# Patient Record
Sex: FEMALE | Race: WHITE | NOT HISPANIC OR LATINO | Employment: OTHER | URBAN - METROPOLITAN AREA
[De-identification: names, ages, dates, MRNs, and addresses within clinical notes are randomized per-mention and may not be internally consistent; named-entity substitution may affect disease eponyms.]

---

## 2024-06-25 ENCOUNTER — OFFICE VISIT (OUTPATIENT)
Dept: FAMILY MEDICINE CLINIC | Facility: CLINIC | Age: 89
End: 2024-06-25
Payer: COMMERCIAL

## 2024-06-25 ENCOUNTER — PATIENT ROUNDING (BHMG ONLY) (OUTPATIENT)
Dept: FAMILY MEDICINE CLINIC | Facility: CLINIC | Age: 89
End: 2024-06-25
Payer: COMMERCIAL

## 2024-06-25 VITALS
HEIGHT: 63 IN | TEMPERATURE: 97.8 F | DIASTOLIC BLOOD PRESSURE: 58 MMHG | OXYGEN SATURATION: 99 % | BODY MASS INDEX: 23 KG/M2 | HEART RATE: 62 BPM | SYSTOLIC BLOOD PRESSURE: 99 MMHG | WEIGHT: 129.8 LBS

## 2024-06-25 DIAGNOSIS — Z76.89 ENCOUNTER TO ESTABLISH CARE: Primary | ICD-10-CM

## 2024-06-25 PROBLEM — E78.2 MIXED HYPERLIPIDEMIA: Status: ACTIVE | Noted: 2024-06-25

## 2024-06-25 RX ORDER — IRBESARTAN 300 MG/1
300 TABLET ORAL DAILY
COMMUNITY

## 2024-06-25 RX ORDER — DONEPEZIL HYDROCHLORIDE 5 MG/1
TABLET, FILM COATED ORAL
COMMUNITY

## 2024-06-25 RX ORDER — PANTOPRAZOLE SODIUM 40 MG/1
TABLET, DELAYED RELEASE ORAL
COMMUNITY
Start: 2024-02-14

## 2024-06-25 RX ORDER — LANOLIN ALCOHOL/MO/W.PET/CERES
1000 CREAM (GRAM) TOPICAL DAILY
COMMUNITY

## 2024-06-25 RX ORDER — DILTIAZEM HYDROCHLORIDE 300 MG/1
300 CAPSULE, COATED, EXTENDED RELEASE ORAL DAILY
COMMUNITY
Start: 2024-01-17

## 2024-06-25 RX ORDER — FAMOTIDINE 20 MG/1
20 TABLET, FILM COATED ORAL
COMMUNITY

## 2024-06-25 RX ORDER — PRAVASTATIN SODIUM 20 MG
20 TABLET ORAL DAILY
COMMUNITY

## 2024-06-25 RX ORDER — SPIRONOLACTONE 25 MG/1
0.5 TABLET ORAL DAILY
COMMUNITY
Start: 2024-06-10

## 2024-06-25 NOTE — PROGRESS NOTES
"Chief Complaint  Establish Care    Subjective        Cat Dickinson presents to [facility] PRIMARY CARE  History of Present Illness    History of Present Illness  Cat is a new patient presenting with her , Mitch, to establish care.  Previous provider was Francesco Kilgore MD.  She has HTN, mixed hyperlipidemia, GERD, mild cognitive impairment with memory loss, nocturnal leg cramps, CKD3b, osteoporosis (prolia Q6mo), BPPV/vertigo, history of kidney stones.  Also follows with Bart Jones MD, neurology. Maribell Duarte MD, urology, Fady Hernandez MD, orthopedics, Licha Willis MD, endocrinology, Gayla Wynne NP, ENT (most recent clinic note from all of these providers reviewed).   She reports feeling generally healthy, maintaining a good mobility, and resides with her  with children nearby.  2024 CBC CBC normal, CMP normal except mildly elevated calcium and creatinine stable at 1.59 (eGFR 31), cholesterol and triglycerides mildly elevated.  2023 TSH/FT4 normal, vitamin D normal.  2024 A1c 5.4.    The patient reports experiencing leg swelling and nocturnal cramps, for which Dr. Kilgore recommended vitamin B complex. She denies the presence of sores or blisters on her legs.  She has been having a lot of stomach problems, frequent diarrhea, and lack of ability to eat very much.    ROS: Denies headaches, changes in vision, changes in hearing, sore throat, shortness of breath, palpitations, nausea/vomiting/constipation/diarrhea, blood in urine or stool, leg swelling.      FH:     Medical: Mother and father had cancer    Siblings: Deferred    Children: 1 son, healthy.  1 daughter with IBS.    Reproductive: .     Objective   Vital Signs:  BP 99/58   Pulse 62   Temp 97.8 °F (36.6 °C)   Ht 160 cm (63\")   Wt 58.9 kg (129 lb 12.8 oz)   SpO2 99%   BMI 22.99 kg/m²   Estimated body mass index is 22.99 kg/m² as calculated from the following:    Height as of this encounter: 160 " "cm (63\").    Weight as of this encounter: 58.9 kg (129 lb 12.8 oz).             Physical Exam  Vitals and nursing note reviewed.   Constitutional:       General: She is not in acute distress.     Appearance: Normal appearance.   HENT:      Head: Normocephalic and atraumatic.   Eyes:      Extraocular Movements: Extraocular movements intact.      Conjunctiva/sclera: Conjunctivae normal.   Cardiovascular:      Rate and Rhythm: Normal rate and regular rhythm.      Heart sounds: Normal heart sounds. No murmur heard.     No friction rub. No gallop.   Pulmonary:      Effort: Pulmonary effort is normal.      Breath sounds: Normal breath sounds. No wheezing, rhonchi or rales.   Abdominal:      General: Bowel sounds are normal. There is no distension.      Palpations: Abdomen is soft.      Tenderness: There is no abdominal tenderness. There is no guarding.   Musculoskeletal:      Right lower leg: Edema (2+) present.      Left lower leg: Edema (2+) present.   Neurological:      General: No focal deficit present.      Mental Status: She is alert. Mental status is at baseline.   Psychiatric:         Mood and Affect: Mood normal.         Behavior: Behavior normal.       Physical Exam          Result Review :        Results                Assessment and Plan   Diagnoses and all orders for this visit:    1. Encounter to establish care (Primary)      Assessment & Plan  1. Establishment of care.  The patient's medical records and lab work was reviewed.    Follow-up  A follow-up appointment is scheduled for 1 month from now.             Social/Preventative:    Risks: Dizziness, osteoporosis, mild cognitive impairment with memory loss.    PAP/HPV: Aged out    Mammogram: 7/31/2023, normal    Dexa: 8/14/2023, osteoporosis, Prolia every 6 month    Colonoscopy: Aged out    Vaccinations: Influenza this season.  COVID in the past.  Shingrix x2 2018, Tdap 2/2024.  PCV3 11/2016.  PPSV23 10/2004.    Eye: Deferred    Dental: Deferred    Foot " exams: N/A    Nicotine: Never    Illicit drugs: Deferred    EtOH: Deferred    Home: Lives at home with .  Son lives close by. Daughter in [place].    Work: Deferred.    Social: Deferred    Exercise: Deferred    Diet: Deferred      Current Outpatient Medications:     ASPIRIN 81 PO, Take 81 mg by mouth Daily., Disp: , Rfl:     dilTIAZem CD (CARDIZEM CD) 300 MG 24 hr capsule, Take 1 capsule by mouth Daily., Disp: , Rfl:     donepezil (ARICEPT) 5 MG tablet, TAKE 1 TABLET BY MOUTH ONCE DAILY WITH LUNCH, Disp: , Rfl:     famotidine (PEPCID) 20 MG tablet, Take 1 tablet by mouth., Disp: , Rfl:     irbesartan (AVAPRO) 300 MG tablet, Take 1 tablet by mouth Daily., Disp: , Rfl:     pantoprazole (PROTONIX) 40 MG EC tablet, TAKE 1 TABLET ONE TIME DAILY BEFORE BREAKFAST (DO NOT CRUSH CHEW OR SPLIT), Disp: , Rfl:     pravastatin (PRAVACHOL) 20 MG tablet, Take 1 tablet by mouth Daily., Disp: , Rfl:     pyridoxine (VITAMIN B-6) 100 MG tablet, Take 1 tablet by mouth Daily., Disp: , Rfl:     Selenium 200 MCG tablet, Take  by mouth., Disp: , Rfl:     spironolactone (ALDACTONE) 25 MG tablet, Take 0.5 tablets by mouth Daily., Disp: , Rfl:     vitamin B-12 (CYANOCOBALAMIN) 1000 MCG tablet, Take 1 tablet by mouth Daily., Disp: , Rfl:      I spent 33 minutes caring for Cat on this date of service. This time includes time spent by me in the following activities:preparing for the visit, reviewing tests, obtaining and/or reviewing a separately obtained history, performing a medically appropriate examination and/or evaluation , counseling and educating the patient/family/caregiver, and documenting information in the medical record  Follow Up   Return in about 27 days (around 7/22/2024) for Medicare Wellness.  Patient was given instructions and counseling regarding her condition or for health maintenance advice. Please see specific information pulled into the AVS if appropriate.     Patient or patient representative verbalized consent  for the use of Ambient Listening during the visit with  Francesco Mejia MD for chart documentation. 6/29/2024  20:33 EDT

## 2024-06-25 NOTE — PROGRESS NOTES
How did you hear about our practice/providers?  SON IS A PATIENT   Tell me about your visit with us. What things went well?    EVERYTHING WENT WELL     We're always looking for ways to make our patients' experiences even better. Do you have recommendations on ways we may improve?  NO    Overall were you satisfied with your first visit to our practice?  YES     Is there anything else I can do for you?  Would you like for me to have my  you?  NO  You may receive a survey from [employer] via mail, text or email to provide feedback about your visit.  We ask that you please take a few minutes to complete the survey and let us know how we are doing.  If for any reason you feel unable to give us the highest rating please let me know.

## 2024-06-29 PROBLEM — I51.7 CARDIOMEGALY: Status: ACTIVE | Noted: 2021-04-26

## 2024-06-29 PROBLEM — Z87.442 HISTORY OF RENAL CALCULI: Status: ACTIVE | Noted: 2023-06-07

## 2024-06-29 PROBLEM — N18.30 STAGE 3 CHRONIC KIDNEY DISEASE: Status: ACTIVE | Noted: 2020-03-02

## 2024-06-29 PROBLEM — I10 BENIGN ESSENTIAL HYPERTENSION: Status: ACTIVE | Noted: 2024-04-03

## 2024-06-29 PROBLEM — I35.1 AORTIC VALVE REGURGITATION: Status: ACTIVE | Noted: 2020-08-21

## 2024-06-29 PROBLEM — H81.10 BENIGN PAROXYSMAL POSITIONAL VERTIGO: Status: ACTIVE | Noted: 2023-07-17

## 2024-06-29 PROBLEM — R41.3 MEMORY IMPAIRMENT: Status: ACTIVE | Noted: 2023-01-30

## 2024-07-29 ENCOUNTER — OFFICE VISIT (OUTPATIENT)
Dept: FAMILY MEDICINE CLINIC | Facility: CLINIC | Age: 89
End: 2024-07-29
Payer: MEDICARE

## 2024-07-29 VITALS
HEIGHT: 63 IN | HEART RATE: 62 BPM | BODY MASS INDEX: 22.79 KG/M2 | SYSTOLIC BLOOD PRESSURE: 94 MMHG | WEIGHT: 128.6 LBS | TEMPERATURE: 98.4 F | DIASTOLIC BLOOD PRESSURE: 52 MMHG | OXYGEN SATURATION: 95 %

## 2024-07-29 DIAGNOSIS — Z00.00 MEDICARE ANNUAL WELLNESS VISIT, SUBSEQUENT: Primary | ICD-10-CM

## 2024-07-29 PROCEDURE — 1170F FXNL STATUS ASSESSED: CPT | Performed by: STUDENT IN AN ORGANIZED HEALTH CARE EDUCATION/TRAINING PROGRAM

## 2024-07-29 PROCEDURE — G0439 PPPS, SUBSEQ VISIT: HCPCS | Performed by: STUDENT IN AN ORGANIZED HEALTH CARE EDUCATION/TRAINING PROGRAM

## 2024-07-29 PROCEDURE — 96160 PT-FOCUSED HLTH RISK ASSMT: CPT | Performed by: STUDENT IN AN ORGANIZED HEALTH CARE EDUCATION/TRAINING PROGRAM

## 2024-07-29 PROCEDURE — 1159F MED LIST DOCD IN RCRD: CPT | Performed by: STUDENT IN AN ORGANIZED HEALTH CARE EDUCATION/TRAINING PROGRAM

## 2024-07-29 PROCEDURE — 1160F RVW MEDS BY RX/DR IN RCRD: CPT | Performed by: STUDENT IN AN ORGANIZED HEALTH CARE EDUCATION/TRAINING PROGRAM

## 2024-07-29 NOTE — PROGRESS NOTES
Subjective   The ABCs of the Annual Wellness Visit  Medicare Wellness Visit      Cat Dickinson is a 89 y.o. patient who presents for a Medicare Wellness Visit.    Cat is an established patient presenting with her , Mitch, and her son, Jignesh, for annual Medicare wellness visit.  She has HTN, mixed hyperlipidemia, GERD, mild cognitive impairment with memory loss, nocturnal leg cramps, CKD3b, osteoporosis (prolia Q6mo), BPPV/vertigo, history of kidney stones in 2023.  Also follows with Bart Jones MD, neurology, Maribell Duarte MD, urology, Fady Hernanedz MD, orthopedics, Licha Willis MD, endocrinology, Mason Ovalle MD, cardiology, Gayla Wynne, CHRISTA, ENT.   She reports feeling generally healthy, maintaining a good mobility, and resides with her  with children nearby.  6/25/2024 CBC CBC normal, CMP normal except mildly elevated calcium and creatinine stable at 1.59 (eGFR 31), cholesterol and triglycerides mildly elevated.  8/21/2023 TSH/FT4 normal, vitamin D normal.  2/26/2024 A1c 5.4.  Echocardiogram was done 8/2020 and showed mild aortic regurgitation but was otherwise negative.     The following portions of the patient's history were reviewed and   updated as appropriate: allergies, current medications, past family history, past medical history, past social history, past surgical history, and problem list.    Compared to one year ago, the patient's physical   health is better.  Compared to one year ago, the patient's mental   health is the same.    Recent Hospitalizations:  She was not admitted to the hospital during the last year.     Current Medical Providers:  Patient Care Team:  Francesco Mejia MD as PCP - General (Family Medicine)  Bart Jones MD (Geriatric Medicine)  Maribell Duarte MD (Internal Medicine)  Fady Hernandez MD (Sports Medicine)  Licha Willis MD as Consulting Physician (Endocrinology)  Gayla Wynne APRN (Nurse Practitioner)  Bart Jones MD,  neurology  Maribell Duarte MD, urology  Fady Hernandez MD, orthopedics  Lichajin Willis MD, endocrinology  Gayla Wynne NP, ENT  Mason Ovalle MD, cardiology    Outpatient Medications Prior to Visit   Medication Sig Dispense Refill    dilTIAZem CD (CARDIZEM CD) 300 MG 24 hr capsule Take 1 capsule by mouth Daily.      donepezil (ARICEPT) 5 MG tablet TAKE 1 TABLET BY MOUTH ONCE DAILY WITH LUNCH      famotidine (PEPCID) 20 MG tablet Take 1 tablet by mouth.      irbesartan (AVAPRO) 300 MG tablet Take 1 tablet by mouth Daily.      pantoprazole (PROTONIX) 40 MG EC tablet TAKE 1 TABLET ONE TIME DAILY BEFORE BREAKFAST (DO NOT CRUSH CHEW OR SPLIT)      pravastatin (PRAVACHOL) 20 MG tablet Take 1 tablet by mouth Daily.      pyridoxine (VITAMIN B-6) 100 MG tablet Take 1 tablet by mouth Daily.      Selenium 200 MCG tablet Take  by mouth.      spironolactone (ALDACTONE) 25 MG tablet Take 0.5 tablets by mouth Daily.      vitamin B-12 (CYANOCOBALAMIN) 1000 MCG tablet Take 1 tablet by mouth Daily.      ASPIRIN 81 PO Take 81 mg by mouth Daily.       No facility-administered medications prior to visit.     No opioid medication identified on active medication list. I have reviewed chart for other potential  high risk medication/s and harmful drug interactions in the elderly.      Aspirin is on active medication list. Aspirin use is not indicated based on review of current medical condition/s. Risk of harm outweighs potential benefits. Patient instructed to discontinue this medication.  .      Patient Active Problem List   Diagnosis    Mixed hyperlipidemia    Aortic valve regurgitation    Primary hypertension    Benign paroxysmal positional vertigo    Cardiomegaly    Gastroesophageal reflux disease    History of renal calculi    Memory impairment    Osteoporosis    Stage 3 chronic kidney disease     Advance Care Planning (Click this link to access ACP Navigator)  Advance Directive is not on file.  ACP discussion was held with the  "patient during this visit. Patient has an advance directive (not in EMR), copy requested.        Objective   Vitals:    24 1304   BP: 94/52   Pulse: 62   Temp: 98.4 °F (36.9 °C)   SpO2: 95%   Weight: 58.3 kg (128 lb 9.6 oz)   Height: 160 cm (63\")       Estimated body mass index is 22.78 kg/m² as calculated from the following:    Height as of this encounter: 160 cm (63\").    Weight as of this encounter: 58.3 kg (128 lb 9.6 oz).    BMI is within normal parameters. No other follow-up for BMI required.        Does the patient have evidence of cognitive impairment? Yes  Lab Results   Component Value Date    CHLPL 201 (H) 2024    TRIG 174 (H) 2024    HDL 53 2024     (H) 2024                                                                                                Health  Risk Assessment    Smoking Status:  Social History     Tobacco Use   Smoking Status Never    Passive exposure: Never   Smokeless Tobacco Not on file     Alcohol Consumption:  Social History     Substance and Sexual Activity   Alcohol Use Never     Fall Risk Screen:  STEADI Fall Risk Assessment was completed, and patient is at LOW risk for falls.Assessment completed on:2024    Depression Screenin/29/2024     1:02 PM   PHQ-2/PHQ-9 Depression Screening   Little Interest or Pleasure in Doing Things 0-->not at all   Feeling Down, Depressed or Hopeless 0-->not at all   PHQ-9: Brief Depression Severity Measure Score 0     Health Habits and Functional and Cognitive Screenin/29/2024     1:00 PM   Functional & Cognitive Status   Do you have difficulty preparing food and eating? No   Do you have difficulty bathing yourself, getting dressed or grooming yourself? No   Do you have difficulty using the toilet? No   Do you have difficulty moving around from place to place? No   Do you have trouble with steps or getting out of a bed or a chair? No   Current Diet Well Balanced Diet   Dental Exam Up to date "   Eye Exam Up to date   Exercise (times per week) 2 times per week   Current Exercises Include Other   Do you need help using the phone?  No   Are you deaf or do you have serious difficulty hearing?  No   Do you need help to go to places out of walking distance? No   Do you need help shopping? No   Do you need help preparing meals?  No   Do you need help with housework?  No   Do you need help with laundry? No   Do you need help taking your medications? No   Do you need help managing money? No   Do you ever drive or ride in a car without wearing a seat belt? No   Have you felt unusual stress, anger or loneliness in the last month? No   Who do you live with? Spouse   If you need help, do you have trouble finding someone available to you? No   Do you have difficulty concentrating, remembering or making decisions? Yes             Age-appropriate Screening Schedule:  Refer to the list below for future screening recommendations based on patient's age, sex and/or medical conditions. Orders for these recommended tests are listed in the plan section. The patient has been provided with a written plan.    Health Maintenance List  Health Maintenance   Topic Date Due    COVID-19 Vaccine (4 - 2023-24 season) 05/25/2025 (Originally 9/1/2023)    RSV Vaccine - Adults (1 - 1-dose 60+ series) 06/25/2025 (Originally 11/16/1993)    INFLUENZA VACCINE  08/01/2024    LIPID PANEL  06/25/2025    ANNUAL WELLNESS VISIT  07/29/2025    DXA SCAN  08/14/2025    TDAP/TD VACCINES (3 - Td or Tdap) 02/26/2034    Pneumococcal Vaccine 65+  Completed    ZOSTER VACCINE  Completed                                                                                                                                                Physical Exam  Vitals and nursing note reviewed.   Constitutional:       General: She is not in acute distress.     Appearance: Normal appearance.   HENT:      Head: Normocephalic and atraumatic.   Eyes:      Extraocular Movements:  Extraocular movements intact.      Conjunctiva/sclera: Conjunctivae normal.   Cardiovascular:      Rate and Rhythm: Normal rate and regular rhythm.      Pulses: Normal pulses.      Heart sounds: Normal heart sounds. No murmur heard.     No friction rub. No gallop.   Pulmonary:      Effort: Pulmonary effort is normal.      Breath sounds: Normal breath sounds. No wheezing, rhonchi or rales.   Abdominal:      General: Bowel sounds are normal.      Palpations: Abdomen is soft.   Musculoskeletal:      Right lower leg: No edema.      Left lower leg: Edema (1+ pitting) present.   Skin:     General: Skin is warm and dry.   Neurological:      General: No focal deficit present.      Mental Status: She is alert. Mental status is at baseline.   Psychiatric:         Mood and Affect: Mood and affect normal.         Speech: Speech normal.         Behavior: Behavior normal. Behavior is cooperative.         Cognition and Memory: Memory is impaired.     CMS Preventative Services Quick Reference  Risk Factors Identified During Encounter  None Identified    The above risks/problems have been discussed with the patient.    Social/Preventative:    Risks: Dizziness, osteoporosis, mild cognitive impairment with memory loss.    PAP/HPV: Aged out    Mammogram: 7/31/2023, normal.  Next scheduled for 8/1/20204, will consider discontinuing.    Dexa: 8/14/2023, osteoporosis, Prolia every 6 month    Colonoscopy: Aged out    Vaccinations: Influenza this season.  COVID in the past.  Shingrix x2 2018, Tdap 2/2024.  PCV3 11/2016.  PPSV23 10/2004.    Eye: recommended annually    Dental: biennially    Foot exams: N/A    Nicotine: Never    Illicit drugs: none    EtOH: none    Home: Lives at home with .  Son lives close by. Daughter in [place].    Work: Clerical work.    Social: Works at [employer].  [facility]    Exercise: Osteoporosis exercises, walks a lot    Diet: Vegetables, no fried foods.  Drinks water, could drink  more.    Pertinent information has been shared with the patient in the After Visit Summary.  An After Visit Summary and PPPS were made available to the patient.    Follow Up:   Next Medicare Wellness visit to be scheduled in 1 year.    Return in about 3 months (around 10/29/2024) for HTN, Labs.    Patient or patient representative verbalized consent for the use of Ambient Listening during the visit with  Francesco Mejia MD for chart documentation. 7/29/2024  20:56 EDT

## 2024-07-30 ENCOUNTER — TELEPHONE (OUTPATIENT)
Dept: FAMILY MEDICINE CLINIC | Facility: CLINIC | Age: 89
End: 2024-07-30
Payer: MEDICARE

## 2024-07-30 DIAGNOSIS — R41.3 MEMORY IMPAIRMENT: ICD-10-CM

## 2024-07-30 DIAGNOSIS — E78.2 MIXED HYPERLIPIDEMIA: Primary | ICD-10-CM

## 2024-07-30 DIAGNOSIS — K21.9 GASTROESOPHAGEAL REFLUX DISEASE, UNSPECIFIED WHETHER ESOPHAGITIS PRESENT: ICD-10-CM

## 2024-07-30 DIAGNOSIS — I51.7 CARDIOMEGALY: ICD-10-CM

## 2024-07-30 DIAGNOSIS — I10 PRIMARY HYPERTENSION: ICD-10-CM

## 2024-07-30 RX ORDER — PANTOPRAZOLE SODIUM 40 MG/1
40 TABLET, DELAYED RELEASE ORAL
Qty: 90 TABLET | Refills: 3 | Status: SHIPPED | OUTPATIENT
Start: 2024-07-30

## 2024-07-30 RX ORDER — PRAVASTATIN SODIUM 20 MG
20 TABLET ORAL DAILY
Qty: 90 TABLET | Refills: 3 | Status: SHIPPED | OUTPATIENT
Start: 2024-07-30

## 2024-07-30 RX ORDER — DILTIAZEM HYDROCHLORIDE 180 MG/1
180 CAPSULE, COATED, EXTENDED RELEASE ORAL DAILY
Qty: 90 CAPSULE | Refills: 3 | Status: SHIPPED | OUTPATIENT
Start: 2024-07-30

## 2024-07-30 RX ORDER — IRBESARTAN 300 MG/1
300 TABLET ORAL DAILY
Qty: 90 TABLET | Refills: 3 | Status: SHIPPED | OUTPATIENT
Start: 2024-07-30

## 2024-07-30 RX ORDER — SPIRONOLACTONE 25 MG/1
12.5 TABLET ORAL DAILY
Qty: 45 TABLET | Refills: 3 | Status: SHIPPED | OUTPATIENT
Start: 2024-07-30

## 2024-07-30 RX ORDER — DONEPEZIL HYDROCHLORIDE 5 MG/1
5 TABLET, FILM COATED ORAL
Qty: 90 TABLET | Refills: 3 | Status: SHIPPED | OUTPATIENT
Start: 2024-07-30

## 2024-07-30 NOTE — TELEPHONE ENCOUNTER
Talked with patient on phone to follow-up from yesterday's annual wellness visit.  She provided information for pharmacy.  Will refill medications.  Lowering dose of Cardizem since BP has been running low at the last clinic visits.  Also talked with son to coordinate as well.  He is not aware of any history of arrhythmia.

## 2024-08-12 ENCOUNTER — TELEPHONE (OUTPATIENT)
Dept: FAMILY MEDICINE CLINIC | Facility: CLINIC | Age: 89
End: 2024-08-12
Payer: MEDICARE

## 2024-08-12 RX ORDER — MULTIVIT-MIN/IRON/FOLIC ACID/K 18-600-40
500 CAPSULE ORAL DAILY
COMMUNITY

## 2024-08-12 RX ORDER — ERGOCALCIFEROL (VITAMIN D2) 10 MCG
400 TABLET ORAL DAILY
COMMUNITY

## 2024-08-12 RX ORDER — FAMOTIDINE 20 MG/1
20 TABLET, FILM COATED ORAL AS NEEDED
COMMUNITY

## 2024-08-23 ENCOUNTER — OFFICE VISIT (OUTPATIENT)
Dept: FAMILY MEDICINE CLINIC | Facility: CLINIC | Age: 89
End: 2024-08-23
Payer: MEDICARE

## 2024-08-23 VITALS
BODY MASS INDEX: 22.79 KG/M2 | WEIGHT: 128.6 LBS | OXYGEN SATURATION: 97 % | HEIGHT: 63 IN | TEMPERATURE: 98.7 F | SYSTOLIC BLOOD PRESSURE: 138 MMHG | HEART RATE: 60 BPM | DIASTOLIC BLOOD PRESSURE: 60 MMHG

## 2024-08-23 DIAGNOSIS — M79.672 LEFT FOOT PAIN: Primary | ICD-10-CM

## 2024-08-23 DIAGNOSIS — M79.675 PAIN OF LEFT GREAT TOE: ICD-10-CM

## 2024-08-23 DIAGNOSIS — M25.662 JOINT STIFFNESS OF LEFT LOWER LEG: ICD-10-CM

## 2024-08-23 PROCEDURE — 1159F MED LIST DOCD IN RCRD: CPT | Performed by: STUDENT IN AN ORGANIZED HEALTH CARE EDUCATION/TRAINING PROGRAM

## 2024-08-23 PROCEDURE — 99213 OFFICE O/P EST LOW 20 MIN: CPT | Performed by: STUDENT IN AN ORGANIZED HEALTH CARE EDUCATION/TRAINING PROGRAM

## 2024-08-23 PROCEDURE — 1160F RVW MEDS BY RX/DR IN RCRD: CPT | Performed by: STUDENT IN AN ORGANIZED HEALTH CARE EDUCATION/TRAINING PROGRAM

## 2024-08-23 RX ORDER — NAPROXEN 250 MG/1
250 TABLET ORAL 2 TIMES DAILY WITH MEALS
Qty: 8 TABLET | Refills: 0 | Status: SHIPPED | OUTPATIENT
Start: 2024-08-23

## 2024-08-23 NOTE — PROGRESS NOTES
Chief Complaint  Left leg and foot pain    Subjective        Cat Dickinson presents to [facility]IMARY [facility]  History of Present Illness  History of Present Illness  Cat is an established patient presenting with her , Mitch for left leg and foot pain.  She has HTN, mixed hyperlipidemia, GERD, mild cognitive impairment with memory loss, nocturnal leg cramps, CKD3b, osteoporosis (prolia Q6mo), BPPV/vertigo, history of kidney stones in 2023.  Also follows with Bart Jones MD, neurology, Maribell Duarte MD, urology, Fady Hernandez MD, orthopedics, Licha Willis MD, endocrinology, Gayla Wynne NP, ENT, Tino Steele DPM.   She reports feeling generally healthy, maintaining a good mobility, and resides with her  with children nearby.  6/25/2024 CBC CBC normal, CMP normal except mildly elevated calcium and creatinine stable at 1.59 (eGFR 31), cholesterol and triglycerides mildly elevated.  8/21/2023 TSH/FT4 normal, vitamin D normal.  2/26/2024 A1c 5.4.  Echocardiogram was done 8/2020 and showed mild aortic regurgitation but was otherwise negative.     She has been experiencing discomfort in her lower leg and foot for several months, with the left big toe appearing red and sore. The onset of the pain was a few months ago, initially localized to a specific area but has since spread. She describes the pain as sharp and shooting up her leg. The toe is tender to touch, and the stiffness in her leg and foot varies daily. She occasionally takes Tylenol for pain relief and does not have ibuprofen, Aleve, or Advil at home.    She has been under the care of Dr. Cedric DPM, for toenail issues, with regular visits every 3 months for toenail trimming. Her next appointment is scheduled for 09/25/2024.    Her  reports that she used to walk to [place] every Monday morning but has been unable to do so for the past 2 to 3 weeks due to pain.    She reports no recent injuries or trauma to the toe. She  "does not have arthritis anywhere as far she knows. She is not experiencing fevers or chills. She has no known allergies to antibiotics.    Objective   Vital Signs:  /60 (BP Location: Right arm, Patient Position: Sitting, Cuff Size: Small Adult)   Pulse 60   Temp 98.7 °F (37.1 °C)   Ht 160 cm (63\")   Wt 58.3 kg (128 lb 9.6 oz)   SpO2 97%   BMI 22.78 kg/m²   Estimated body mass index is 22.78 kg/m² as calculated from the following:    Height as of this encounter: 160 cm (63\").    Weight as of this encounter: 58.3 kg (128 lb 9.6 oz).       BMI is within normal parameters. No other follow-up for BMI required.      Physical Exam  Vitals reviewed.   Constitutional:       General: She is not in acute distress.     Appearance: Normal appearance.   HENT:      Head: Normocephalic and atraumatic.   Cardiovascular:      Rate and Rhythm: Normal rate and regular rhythm.      Heart sounds: Normal heart sounds. No murmur heard.     No friction rub. No gallop.   Pulmonary:      Effort: Pulmonary effort is normal.      Breath sounds: Normal breath sounds. No wheezing, rhonchi or rales.   Musculoskeletal:      Right lower leg: No edema.      Left lower leg: No edema.   Feet:      Right foot:      Toenail Condition: Right toenails are abnormally thick.      Left foot:      Skin integrity: Erythema (Faint patchy) present. No ulcer, blister, skin breakdown, warmth, callus or fissure.      Toenail Condition: Left toenails are abnormally thick.      Comments: Erythema distal left great toe.  Faint, patchy left foot erythema.  Very faint, patchy left leg erythema.  No excessive warmth, or signs of infection.  Great toenails thickened bilaterally.  No tenderness to palpation of left foot, ankle, leg.  Normal mobility of left toes, foot, ankle.  Neurological:      General: No focal deficit present.      Mental Status: She is alert and oriented to person, place, and time. Mental status is at baseline.        Physical " Exam      Result Review :          Results               Assessment and Plan     Diagnoses and all orders for this visit:    1. Left foot pain (Primary)  -     naproxen (NAPROSYN) 250 MG tablet; Take 1 tablet by mouth 2 (Two) Times a Day With Meals.  Dispense: 8 tablet; Refill: 0    2. Pain of left great toe  -     naproxen (NAPROSYN) 250 MG tablet; Take 1 tablet by mouth 2 (Two) Times a Day With Meals.  Dispense: 8 tablet; Refill: 0    3. Joint stiffness of left lower leg  -     naproxen (NAPROSYN) 250 MG tablet; Take 1 tablet by mouth 2 (Two) Times a Day With Meals.  Dispense: 8 tablet; Refill: 0      Assessment & Plan  1. Left foot/leg pain.  The left big toe is tender and exhibits redness, although no signs of infection are present. The redness could be due to irritation from footwear.  Her pain symptoms are suggestive of plantar fasciitis, however, lack of tenderness on exam makes this less likely.  Since no signs of infection are present, will treat conservatively initially.  She was advised to soak her foot in warm water with a small amount of dish soap for 15 minutes, two to three times daily, ensuring thorough drying afterwards.  Also recommended naproxen, to be taken twice daily for 3-4 days. She was also instructed to perform stretching exercises for plantar fasciitis and Achilles tendinitis rehabilitation, and to apply a cold water bottle to the arch of her foot to alleviate tendon discomfort and improve arch support.  Stretches were provided in AVS and reviewed with patient in clinic.  Should her condition worsen, she should contact the clinic immediately for a potential antibiotic prescription. If there is no improvement in a few days, she should call the clinic for further evaluation and possible antibiotic treatment.  She should also consider follow-up with podiatry.         Follow Up     Return in 2 months (on 10/29/2024) for As previously scheduled for HTN, Labs.  Patient was given instructions and  counseling regarding her condition or for health maintenance advice. Please see specific information pulled into the AVS if appropriate.     Patient or patient representative verbalized consent for the use of Ambient Listening during the visit with  Francesco Mejia MD for chart documentation. 8/24/2024  12:36 EDT

## 2024-08-23 NOTE — PATIENT INSTRUCTIONS
Take Alieve twice a day for 3 days with food.    Foot soak Instructions   -fill the basin with lukewarm water and add dish soap   -submerge the feet and soak for 10-15 minutes   -pat the feet dry with a clean towel    If not doing better by Monday, call the clinic.

## 2024-09-09 ENCOUNTER — TELEPHONE (OUTPATIENT)
Dept: FAMILY MEDICINE CLINIC | Facility: CLINIC | Age: 89
End: 2024-09-09
Payer: MEDICARE

## 2024-09-09 DIAGNOSIS — E83.52 HYPERCALCEMIA: Primary | ICD-10-CM

## 2024-09-09 DIAGNOSIS — M81.0 OSTEOPOROSIS, UNSPECIFIED OSTEOPOROSIS TYPE, UNSPECIFIED PATHOLOGICAL FRACTURE PRESENCE: ICD-10-CM

## 2024-09-09 NOTE — TELEPHONE ENCOUNTER
Caller: Cat Dickinson    Relationship: Self    Best call back number: [phone]     What orders are you requesting (i.e. lab or imaging):  LABS    In what timeframe would the patient need to come in:      Where will you receive your lab/imaging services:      Additional notes: PATIENT CALLED AND NEED LABS ORDERED AND SCHEDULED ONE WEEK PRIOR TO 9/23/24 DATE OF HER FOR PROLIA INJECTION.  PATIENT ALWAYS HAS THE PCP TO ORDER AND DO LABS PRIOR TO THE PROLIA INJECTION SHE GETS AT DR AHMADI OFFICE.  PATIENT WOULD LIKE TO HAVE THE LABS SCHEDULED ON 9/17/24 DUE TO HER  HAS LAB APPT THAT SAME DAY AT 11 AM.  PLEASE CALL HER BACK AS SOON AS POSSIBLE.

## 2024-09-17 ENCOUNTER — TELEPHONE (OUTPATIENT)
Dept: FAMILY MEDICINE CLINIC | Facility: CLINIC | Age: 89
End: 2024-09-17
Payer: MEDICARE

## 2024-09-17 ENCOUNTER — LAB (OUTPATIENT)
Dept: FAMILY MEDICINE CLINIC | Facility: CLINIC | Age: 89
End: 2024-09-17
Payer: MEDICARE

## 2024-09-17 DIAGNOSIS — M81.0 OSTEOPOROSIS, UNSPECIFIED OSTEOPOROSIS TYPE, UNSPECIFIED PATHOLOGICAL FRACTURE PRESENCE: Primary | ICD-10-CM

## 2024-09-17 DIAGNOSIS — M81.0 OSTEOPOROSIS, UNSPECIFIED OSTEOPOROSIS TYPE, UNSPECIFIED PATHOLOGICAL FRACTURE PRESENCE: ICD-10-CM

## 2024-09-18 LAB
BUN SERPL-MCNC: 25 MG/DL (ref 8–23)
BUN/CREAT SERPL: 18.8 (ref 7–25)
CALCIUM SERPL-MCNC: 10.2 MG/DL (ref 8.2–9.6)
CHLORIDE SERPL-SCNC: 106 MMOL/L (ref 98–107)
CO2 SERPL-SCNC: 25.1 MMOL/L (ref 22–29)
CREAT SERPL-MCNC: 1.33 MG/DL (ref 0.57–1)
EGFRCR SERPLBLD CKD-EPI 2021: 38.1 ML/MIN/1.73
GLUCOSE SERPL-MCNC: 87 MG/DL (ref 65–99)
POTASSIUM SERPL-SCNC: 4.3 MMOL/L (ref 3.5–5.2)
SODIUM SERPL-SCNC: 137 MMOL/L (ref 136–145)

## 2024-10-03 ENCOUNTER — TELEPHONE (OUTPATIENT)
Dept: FAMILY MEDICINE CLINIC | Facility: CLINIC | Age: 89
End: 2024-10-03
Payer: MEDICARE

## 2024-10-03 NOTE — TELEPHONE ENCOUNTER
HUB TO RELAY  I LVM FOR PATIENT ABOUT  NO LONGER BEING WITH [facility] AND TO SEE IF SHE WANTS TO SWITCH TO

## 2024-11-10 ENCOUNTER — APPOINTMENT (OUTPATIENT)
Dept: CARDIOLOGY | Facility: HOSPITAL | Age: 89
End: 2024-11-10
Payer: MEDICARE

## 2024-11-10 ENCOUNTER — HOSPITAL ENCOUNTER (EMERGENCY)
Facility: HOSPITAL | Age: 89
Discharge: HOME | End: 2024-11-10
Attending: STUDENT IN AN ORGANIZED HEALTH CARE EDUCATION/TRAINING PROGRAM | Admitting: STUDENT IN AN ORGANIZED HEALTH CARE EDUCATION/TRAINING PROGRAM
Payer: MEDICARE

## 2024-11-10 ENCOUNTER — APPOINTMENT (OUTPATIENT)
Dept: GENERAL RADIOLOGY | Facility: HOSPITAL | Age: 89
End: 2024-11-10
Payer: MEDICARE

## 2024-11-10 VITALS
WEIGHT: 125 LBS | TEMPERATURE: 99.5 F | DIASTOLIC BLOOD PRESSURE: 50 MMHG | SYSTOLIC BLOOD PRESSURE: 128 MMHG | HEART RATE: 73 BPM | OXYGEN SATURATION: 94 % | HEIGHT: 64 IN | RESPIRATION RATE: 16 BRPM | BODY MASS INDEX: 21.34 KG/M2

## 2024-11-10 DIAGNOSIS — L03.116 CELLULITIS OF LEFT LOWER LEG: Primary | ICD-10-CM

## 2024-11-10 LAB
ALBUMIN SERPL-MCNC: 3.8 G/DL (ref 3.5–5.2)
ALBUMIN/GLOB SERPL: 1.4 G/DL
ALP SERPL-CCNC: 110 U/L (ref 39–117)
ALT SERPL W P-5'-P-CCNC: 21 U/L (ref 1–33)
ANION GAP SERPL CALCULATED.3IONS-SCNC: 9 MMOL/L (ref 5–15)
AST SERPL-CCNC: 19 U/L (ref 1–32)
BASOPHILS # BLD AUTO: 0.1 10*3/MM3 (ref 0–0.2)
BASOPHILS NFR BLD AUTO: 0.5 % (ref 0–1.5)
BH CV LOWER VASCULAR LEFT COMMON FEMORAL AUGMENT: NORMAL
BH CV LOWER VASCULAR LEFT COMMON FEMORAL COMPETENT: NORMAL
BH CV LOWER VASCULAR LEFT COMMON FEMORAL COMPRESS: NORMAL
BH CV LOWER VASCULAR LEFT COMMON FEMORAL PHASIC: NORMAL
BH CV LOWER VASCULAR LEFT COMMON FEMORAL SPONT: NORMAL
BH CV LOWER VASCULAR LEFT DISTAL FEMORAL COMPRESS: NORMAL
BH CV LOWER VASCULAR LEFT GASTRONEMIUS COMPRESS: NORMAL
BH CV LOWER VASCULAR LEFT GREATER SAPH AK COMPRESS: NORMAL
BH CV LOWER VASCULAR LEFT GREATER SAPH BK COMPRESS: NORMAL
BH CV LOWER VASCULAR LEFT LESSER SAPH COMPRESS: NORMAL
BH CV LOWER VASCULAR LEFT MID FEMORAL AUGMENT: NORMAL
BH CV LOWER VASCULAR LEFT MID FEMORAL COMPETENT: NORMAL
BH CV LOWER VASCULAR LEFT MID FEMORAL COMPRESS: NORMAL
BH CV LOWER VASCULAR LEFT MID FEMORAL PHASIC: NORMAL
BH CV LOWER VASCULAR LEFT MID FEMORAL SPONT: NORMAL
BH CV LOWER VASCULAR LEFT PERONEAL COMPRESS: NORMAL
BH CV LOWER VASCULAR LEFT POPLITEAL AUGMENT: NORMAL
BH CV LOWER VASCULAR LEFT POPLITEAL COMPETENT: NORMAL
BH CV LOWER VASCULAR LEFT POPLITEAL COMPRESS: NORMAL
BH CV LOWER VASCULAR LEFT POPLITEAL PHASIC: NORMAL
BH CV LOWER VASCULAR LEFT POPLITEAL SPONT: NORMAL
BH CV LOWER VASCULAR LEFT POSTERIOR TIBIAL COMPRESS: NORMAL
BH CV LOWER VASCULAR LEFT PROFUNDA FEMORAL COMPRESS: NORMAL
BH CV LOWER VASCULAR LEFT PROXIMAL FEMORAL COMPRESS: NORMAL
BH CV LOWER VASCULAR LEFT SAPHENOFEMORAL JUNCTION COMPRESS: NORMAL
BH CV LOWER VASCULAR RIGHT COMMON FEMORAL AUGMENT: NORMAL
BH CV LOWER VASCULAR RIGHT COMMON FEMORAL COMPETENT: NORMAL
BH CV LOWER VASCULAR RIGHT COMMON FEMORAL COMPRESS: NORMAL
BH CV LOWER VASCULAR RIGHT COMMON FEMORAL PHASIC: NORMAL
BH CV LOWER VASCULAR RIGHT COMMON FEMORAL SPONT: NORMAL
BILIRUB SERPL-MCNC: 0.2 MG/DL (ref 0–1.2)
BUN SERPL-MCNC: 28 MG/DL (ref 8–23)
BUN/CREAT SERPL: 19.9 (ref 7–25)
CALCIUM SPEC-SCNC: 10.2 MG/DL (ref 8.2–9.6)
CHLORIDE SERPL-SCNC: 103 MMOL/L (ref 98–107)
CO2 SERPL-SCNC: 24 MMOL/L (ref 22–29)
CREAT SERPL-MCNC: 1.41 MG/DL (ref 0.57–1)
CRP SERPL-MCNC: 6.39 MG/DL (ref 0–0.5)
DEPRECATED RDW RBC AUTO: 39.8 FL (ref 37–54)
EGFRCR SERPLBLD CKD-EPI 2021: 35.5 ML/MIN/1.73
EOSINOPHIL # BLD AUTO: 0.17 10*3/MM3 (ref 0–0.4)
EOSINOPHIL NFR BLD AUTO: 0.8 % (ref 0.3–6.2)
ERYTHROCYTE [DISTWIDTH] IN BLOOD BY AUTOMATED COUNT: 11.8 % (ref 12.3–15.4)
ERYTHROCYTE [SEDIMENTATION RATE] IN BLOOD: 21 MM/HR (ref 0–30)
GLOBULIN UR ELPH-MCNC: 2.8 GM/DL
GLUCOSE SERPL-MCNC: 116 MG/DL (ref 65–99)
HCT VFR BLD AUTO: 29.9 % (ref 34–46.6)
HGB BLD-MCNC: 10 G/DL (ref 12–15.9)
HOLD SPECIMEN: NORMAL
IMM GRANULOCYTES # BLD AUTO: 0.22 10*3/MM3 (ref 0–0.05)
IMM GRANULOCYTES NFR BLD AUTO: 1.1 % (ref 0–0.5)
LYMPHOCYTES # BLD AUTO: 1.81 10*3/MM3 (ref 0.7–3.1)
LYMPHOCYTES NFR BLD AUTO: 8.7 % (ref 19.6–45.3)
MCH RBC QN AUTO: 31.1 PG (ref 26.6–33)
MCHC RBC AUTO-ENTMCNC: 33.4 G/DL (ref 31.5–35.7)
MCV RBC AUTO: 92.9 FL (ref 79–97)
MONOCYTES # BLD AUTO: 1.82 10*3/MM3 (ref 0.1–0.9)
MONOCYTES NFR BLD AUTO: 8.7 % (ref 5–12)
NEUTROPHILS NFR BLD AUTO: 16.77 10*3/MM3 (ref 1.7–7)
NEUTROPHILS NFR BLD AUTO: 80.2 % (ref 42.7–76)
NRBC BLD AUTO-RTO: 0 /100 WBC (ref 0–0.2)
PLATELET # BLD AUTO: 275 10*3/MM3 (ref 140–450)
PMV BLD AUTO: 10 FL (ref 6–12)
POTASSIUM SERPL-SCNC: 4.3 MMOL/L (ref 3.5–5.2)
PROT SERPL-MCNC: 6.6 G/DL (ref 6–8.5)
RBC # BLD AUTO: 3.22 10*6/MM3 (ref 3.77–5.28)
SODIUM SERPL-SCNC: 136 MMOL/L (ref 136–145)
WBC NRBC COR # BLD AUTO: 20.89 10*3/MM3 (ref 3.4–10.8)
WHOLE BLOOD HOLD COAG: NORMAL
WHOLE BLOOD HOLD SPECIMEN: NORMAL

## 2024-11-10 PROCEDURE — 25010000002 CEFAZOLIN PER 500 MG: Performed by: STUDENT IN AN ORGANIZED HEALTH CARE EDUCATION/TRAINING PROGRAM

## 2024-11-10 PROCEDURE — 85025 COMPLETE CBC W/AUTO DIFF WBC: CPT | Performed by: STUDENT IN AN ORGANIZED HEALTH CARE EDUCATION/TRAINING PROGRAM

## 2024-11-10 PROCEDURE — 73650 X-RAY EXAM OF HEEL: CPT

## 2024-11-10 PROCEDURE — 93971 EXTREMITY STUDY: CPT

## 2024-11-10 PROCEDURE — 96365 THER/PROPH/DIAG IV INF INIT: CPT

## 2024-11-10 PROCEDURE — 80053 COMPREHEN METABOLIC PANEL: CPT | Performed by: STUDENT IN AN ORGANIZED HEALTH CARE EDUCATION/TRAINING PROGRAM

## 2024-11-10 PROCEDURE — 36415 COLL VENOUS BLD VENIPUNCTURE: CPT

## 2024-11-10 PROCEDURE — 85652 RBC SED RATE AUTOMATED: CPT | Performed by: STUDENT IN AN ORGANIZED HEALTH CARE EDUCATION/TRAINING PROGRAM

## 2024-11-10 PROCEDURE — 86140 C-REACTIVE PROTEIN: CPT | Performed by: STUDENT IN AN ORGANIZED HEALTH CARE EDUCATION/TRAINING PROGRAM

## 2024-11-10 PROCEDURE — 93971 EXTREMITY STUDY: CPT | Performed by: SURGERY

## 2024-11-10 PROCEDURE — 99284 EMERGENCY DEPT VISIT MOD MDM: CPT

## 2024-11-10 RX ORDER — CEPHALEXIN 500 MG/1
1000 CAPSULE ORAL 2 TIMES DAILY
Qty: 28 CAPSULE | Refills: 0 | Status: SHIPPED | OUTPATIENT
Start: 2024-11-10 | End: 2024-11-18

## 2024-11-10 RX ORDER — ACETAMINOPHEN 500 MG
1000 TABLET ORAL ONCE
Status: COMPLETED | OUTPATIENT
Start: 2024-11-10 | End: 2024-11-10

## 2024-11-10 RX ADMIN — SODIUM CHLORIDE 2000 MG: 900 INJECTION INTRAVENOUS at 18:43

## 2024-11-10 RX ADMIN — ACETAMINOPHEN 1000 MG: 500 TABLET ORAL at 18:42

## 2024-11-10 NOTE — ED PROVIDER NOTES
EMERGENCY DEPARTMENT ENCOUNTER  Room Number:  11/11  PCP: Tessa Elmore MD  Independent Historians: Patient and Family      HPI:  Chief Complaint: had concerns including Leg Pain and Leg Swelling.     A complete HPI/ROS/PMH/PSH/SH/FH are unobtainable due to: None    Chronic or social conditions impacting patient care (Social Determinants of Health): None      Context: Cat Dickinson is a 89 y.o. female with a medical history of hypertension, hyperlipidemia, CKD who presents to the ED c/o acute left lower leg pain redness and swelling.  She reports she strained her left calf several months ago but she has been following with podiatry for.  She had a MRI of her left calf.  She reports over the past several days she has had worsening pain and swelling of her left leg.  She was told to go to the ER to be evaluated for cellulitis or DVT.  Patient's family at bedside notes that she has had an ulcer in her left heel for approximately 2 months.  No other complaints at this time.  No history of diabetes.      Review of prior external notes (non-ED) -and- Review of prior external test results outside of this encounter:  Podiatry note 10/31/2024.  JOSE ROBERTO 0.92 on the right and 0.22 on the left suggestive of severe arterial disease on the left.    Prescription drug monitoring program review:         PAST MEDICAL HISTORY  Active Ambulatory Problems     Diagnosis Date Noted    Mixed hyperlipidemia 06/25/2024    Aortic valve regurgitation 08/21/2020    Primary hypertension 04/03/2024    Benign paroxysmal positional vertigo 07/17/2023    Cardiomegaly 04/26/2021    Gastroesophageal reflux disease 09/21/2016    History of renal calculi 06/07/2023    Memory impairment 01/30/2023    Osteoporosis 08/09/2013    Stage 3 chronic kidney disease 03/02/2020     Resolved Ambulatory Problems     Diagnosis Date Noted    No Resolved Ambulatory Problems     Past Medical History:   Diagnosis Date    Dementia     Hypertension     Kidney stones           PAST SURGICAL HISTORY  Past Surgical History:   Procedure Laterality Date    KIDNEY STONE SURGERY           FAMILY HISTORY  Family History   Problem Relation Age of Onset    Cancer Mother     Cancer Father          SOCIAL HISTORY  Social History     Socioeconomic History    Marital status:    Tobacco Use    Smoking status: Never     Passive exposure: Never    Smokeless tobacco: Never   Vaping Use    Vaping status: Never Used   Substance and Sexual Activity    Alcohol use: Never    Drug use: Never         ALLERGIES  Hydrochlorothiazide and Lovastatin      REVIEW OF SYSTEMS  Review of Systems  Included in HPI  All systems reviewed and negative except for those discussed in HPI.      PHYSICAL EXAM    I have reviewed the triage vital signs and nursing notes.    ED Triage Vitals   Temp Heart Rate Resp BP SpO2   11/10/24 1651 11/10/24 1651 11/10/24 1651 11/10/24 1654 11/10/24 1651   99.5 °F (37.5 °C) 84 16 133/59 99 %      Temp src Heart Rate Source Patient Position BP Location FiO2 (%)   -- -- -- -- --              Physical Exam  GENERAL: alert, no acute distress  SKIN: Warm, dry, mild erythema to the left lower leg, 3 cm ulcer left heel  HENT: Normocephalic, atraumatic  EYES: no scleral icterus  CV: regular rhythm, regular rate  RESPIRATORY: normal effort, lungs clear  ABDOMEN: soft, nontender, nondistended  MUSCULOSKELETAL: no deformity, 2+ pretibial pitting edema of the left lower extremity  NEURO: alert, moves all extremities, follows commands            LAB RESULTS  Recent Results (from the past 24 hours)   Green Top (Gel)    Collection Time: 11/10/24  5:45 PM   Result Value Ref Range    Extra Tube Hold for add-ons.    Lavender Top    Collection Time: 11/10/24  5:45 PM   Result Value Ref Range    Extra Tube hold for add-on    Gold Top - SST    Collection Time: 11/10/24  5:45 PM   Result Value Ref Range    Extra Tube Hold for add-ons.    Gray Top    Collection Time: 11/10/24  5:45 PM   Result Value Ref  Range    Extra Tube Hold for add-ons.    Light Blue Top    Collection Time: 11/10/24  5:45 PM   Result Value Ref Range    Extra Tube Hold for add-ons.    Comprehensive Metabolic Panel    Collection Time: 11/10/24  5:45 PM    Specimen: Blood   Result Value Ref Range    Glucose 116 (H) 65 - 99 mg/dL    BUN 28 (H) 8 - 23 mg/dL    Creatinine 1.41 (H) 0.57 - 1.00 mg/dL    Sodium 136 136 - 145 mmol/L    Potassium 4.3 3.5 - 5.2 mmol/L    Chloride 103 98 - 107 mmol/L    CO2 24.0 22.0 - 29.0 mmol/L    Calcium 10.2 (H) 8.2 - 9.6 mg/dL    Total Protein 6.6 6.0 - 8.5 g/dL    Albumin 3.8 3.5 - 5.2 g/dL    ALT (SGPT) 21 1 - 33 U/L    AST (SGOT) 19 1 - 32 U/L    Alkaline Phosphatase 110 39 - 117 U/L    Total Bilirubin 0.2 0.0 - 1.2 mg/dL    Globulin 2.8 gm/dL    A/G Ratio 1.4 g/dL    BUN/Creatinine Ratio 19.9 7.0 - 25.0    Anion Gap 9.0 5.0 - 15.0 mmol/L    eGFR 35.5 (L) >60.0 mL/min/1.73   CBC Auto Differential    Collection Time: 11/10/24  5:45 PM    Specimen: Blood   Result Value Ref Range    WBC 20.89 (H) 3.40 - 10.80 10*3/mm3    RBC 3.22 (L) 3.77 - 5.28 10*6/mm3    Hemoglobin 10.0 (L) 12.0 - 15.9 g/dL    Hematocrit 29.9 (L) 34.0 - 46.6 %    MCV 92.9 79.0 - 97.0 fL    MCH 31.1 26.6 - 33.0 pg    MCHC 33.4 31.5 - 35.7 g/dL    RDW 11.8 (L) 12.3 - 15.4 %    RDW-SD 39.8 37.0 - 54.0 fl    MPV 10.0 6.0 - 12.0 fL    Platelets 275 140 - 450 10*3/mm3    Neutrophil % 80.2 (H) 42.7 - 76.0 %    Lymphocyte % 8.7 (L) 19.6 - 45.3 %    Monocyte % 8.7 5.0 - 12.0 %    Eosinophil % 0.8 0.3 - 6.2 %    Basophil % 0.5 0.0 - 1.5 %    Immature Grans % 1.1 (H) 0.0 - 0.5 %    Neutrophils, Absolute 16.77 (H) 1.70 - 7.00 10*3/mm3    Lymphocytes, Absolute 1.81 0.70 - 3.10 10*3/mm3    Monocytes, Absolute 1.82 (H) 0.10 - 0.90 10*3/mm3    Eosinophils, Absolute 0.17 0.00 - 0.40 10*3/mm3    Basophils, Absolute 0.10 0.00 - 0.20 10*3/mm3    Immature Grans, Absolute 0.22 (H) 0.00 - 0.05 10*3/mm3    nRBC 0.0 0.0 - 0.2 /100 WBC   C-reactive Protein    Collection  Time: 11/10/24  5:45 PM    Specimen: Blood   Result Value Ref Range    C-Reactive Protein 6.39 (H) 0.00 - 0.50 mg/dL   Sedimentation Rate    Collection Time: 11/10/24  5:45 PM    Specimen: Blood   Result Value Ref Range    Sed Rate 21 0 - 30 mm/hr   Duplex Venous Lower Extremity - Left CAR    Collection Time: 11/10/24  6:36 PM   Result Value Ref Range    Right Common Femoral Spont Y     Right Common Femoral Competent Y     Right Common Femoral Phasic Y     Right Common Femoral Compress C     Right Common Femoral Augment Y     Left Common Femoral Spont Y     Left Common Femoral Competent Y     Left Common Femoral Phasic Y     Left Common Femoral Compress C     Left Common Femoral Augment Y     Left Saphenofemoral Junction Compress C     Left Profunda Femoral Compress C     Left Proximal Femoral Compress C     Left Mid Femoral Spont Y     Left Mid Femoral Competent Y     Left Mid Femoral Phasic Y     Left Mid Femoral Compress C     Left Mid Femoral Augment Y     Left Distal Femoral Compress C     Left Popliteal Spont Y     Left Popliteal Competent Y     Left Popliteal Phasic Y     Left Popliteal Compress C     Left Popliteal Augment Y     Left Posterior Tibial Compress C     Left Peroneal Compress C     Left Gastronemius Compress C     Left Greater Saph AK Compress C     Left Greater Saph BK Compress C     Left Lesser Saph Compress C          RADIOLOGY  Duplex Venous Lower Extremity - Left CAR    Result Date: 11/10/2024    Normal left lower extremity venous duplex scan.     XR Calcaneus 2+ View Left    Result Date: 11/10/2024  XR CALCANEUS 2+ VW LEFT-  DATE OF EXAM: 11/10/2024 5:34 PM  INDICATION: ulcer.  COMPARISON: None available.  TECHNIQUE: 2 views of the calcaneus were obtained.  FINDINGS: No evidence of acute fracture or dislocation. No lytic or destructive osseous changes to suggest osteomyelitis. The tibiotalar and subtalar joint spaces are fairly well-maintained. Soft tissue swelling at the posterior and  plantar hindfoot.      Soft tissue swelling, without radiographic evidence of acute fracture, dislocation, or osteomyelitis.  This report was finalized on 11/10/2024 5:59 PM by Sigifredo Bryan MD on Workstation: [id number]         MEDICATIONS GIVEN IN ER  Medications   ceFAZolin 2000 mg IVPB in 100 mL NS (MBP) (2,000 mg Intravenous New Bag 11/10/24 1843)   acetaminophen (TYLENOL) tablet 1,000 mg (1,000 mg Oral Given 11/10/24 1842)         ORDERS PLACED DURING THIS VISIT:  Orders Placed This Encounter   Procedures    XR Calcaneus 2+ View Left    George Draw    Comprehensive Metabolic Panel    CBC Auto Differential    C-reactive Protein    Sedimentation Rate    Green Top (Gel)    Lavender Top    Gold Top - SST    Gray Top    Light Blue Top    CBC & Differential         OUTPATIENT MEDICATION MANAGEMENT:  No current Epic-ordered facility-administered medications on file.     Current Outpatient Medications Ordered in Epic   Medication Sig Dispense Refill    Ascorbic Acid (Vitamin C) 500 MG capsule Take 500 mg by mouth Daily.      cephalexin (KEFLEX) 500 MG capsule Take 2 capsules by mouth 2 (Two) Times a Day for 7 days. 28 capsule 0    dilTIAZem CD (Cardizem CD) 180 MG 24 hr capsule Take 1 capsule by mouth Daily. 90 capsule 3    donepezil (ARICEPT) 5 MG tablet Take 1 tablet by mouth Daily With Lunch. 90 tablet 3    famotidine (PEPCID) 20 MG tablet Take 1 tablet by mouth.      irbesartan (AVAPRO) 300 MG tablet Take 1 tablet by mouth Daily. (Patient not taking: Reported on 8/23/2024) 90 tablet 3    naproxen (NAPROSYN) 250 MG tablet Take 1 tablet by mouth 2 (Two) Times a Day With Meals. 8 tablet 0    pantoprazole (PROTONIX) 40 MG EC tablet Take 1 tablet by mouth Every Morning Before Breakfast. 90 tablet 3    pravastatin (PRAVACHOL) 20 MG tablet Take 1 tablet by mouth Daily. (Patient not taking: Reported on 8/23/2024) 90 tablet 3    pyridoxine (VITAMIN B-6) 100 MG tablet Take 1 tablet by mouth Daily.      Selenium 200 MCG  tablet Take  by mouth.      spironolactone (ALDACTONE) 25 MG tablet Take 0.5 tablets by mouth Daily. 45 tablet 3    vitamin B-12 (CYANOCOBALAMIN) 1000 MCG tablet Take 1 tablet by mouth Daily.      Vitamin D, Cholecalciferol, (CHOLECALCIFEROL) 10 MCG (400 UNIT) tablet Take 1 tablet by mouth Daily.           PROCEDURES  Procedures            PROGRESS, DATA ANALYSIS, CONSULTS, AND MEDICAL DECISION MAKING  All labs have been independently interpreted by me.  All radiology studies have been reviewed by me. All EKG's have been independently viewed and interpreted by me.  Discussion below represents my analysis of pertinent findings related to patient's condition, differential diagnosis, treatment plan and final disposition.    Differential diagnosis includes but is not limited to cellulitis, DVT, ulcer, osteomyelitis.    Clinical Scores:                   ED Course as of 11/10/24 1947   Sun Nov 10, 2024   1723 Patient here with worsening pain redness and swelling of her left lower leg.  Concern primarily about cellulitis or blood clot.  She reports she strained her left calf several months ago and has been following up with podiatry.  They note that she has had an ulcer on her left heel for approximately 2 months.  No other complaints at this time.  Will obtain x-ray of heel, basic blood work inflammatory markers, ultrasound left lower extremity.  They are agreeable with plan [DN]   1758 WBC(!): 20.89  Will give antibiotics, IV Ancef [DN]   1800 XR Calcaneus 2+ View Left  I reviewed x-ray images, no obvious bony changes [DN]   1824 Creatinine(!): 1.41  At baseline [DN]   1824 C-Reactive Protein(!): 6.39 [DN]   1914 Duplex Venous Lower Extremity - Left CAR  I reviewed ultrasound image, no obvious DVT [DN]   1918 I discussed results with patient and family at bedside.  Plan for antibiotic prescription for cellulitis and discharge home.  Strict return precautions.  They are agreeable to plan [DN]      ED Course User  Index  [DN] Tyler Jones MD             AS OF 19:47 EST VITALS:    BP - 128/50  HR - 73  TEMP - 99.5 °F (37.5 °C)  O2 SATS - 94%    COMPLEXITY OF CARE  Admission was considered but after careful review of the patient's presentation, physical examination, diagnostic results, and response to treatment the patient may be safely discharged with outpatient follow-up.      DIAGNOSIS  Final diagnoses:   Cellulitis of left lower leg         DISPOSITION  ED Disposition       ED Disposition   Discharge    Condition   Stable    Comment   --                Please note that portions of this document were completed with a voice recognition program.    Note Disclaimer: At [facility], we believe that sharing information builds trust and better relationships. You are receiving this note because you recently visited [facility]. It is possible you will see health information before a provider has talked with you about it. This kind of information can be easy to misunderstand. To help you fully understand what it means for your health, we urge you to discuss this note with your provider.         Tyler Jones MD  11/10/24 1727       Tyler Jones MD  11/10/24 8882

## 2024-11-10 NOTE — ED TRIAGE NOTES
Pt arrives for left calf pain and swelling. Pt reports there is a wound to the bottom of her heel. Pt originally injured her calf 8 weeks ago and has seen her doctor but the redness and swelling is worsening

## 2024-11-13 ENCOUNTER — TRANSCRIBE ORDERS (OUTPATIENT)
Age: 89
End: 2024-11-13
Payer: MEDICARE

## 2024-11-13 DIAGNOSIS — I73.9 SEVERE ARTERIAL INSUFFICIENCY OF LEFT LOWER EXTREMITY: Primary | ICD-10-CM

## 2024-11-14 ENCOUNTER — TELEPHONE (OUTPATIENT)
Age: 89
End: 2024-11-14
Payer: MEDICARE

## 2024-11-14 NOTE — TELEPHONE ENCOUNTER
Received referral on this patient.  [facility]  Called Dr. Vera's office to let them know we do not take yesterday.      Today, three more faxes came under Dr. Ramirez's name who is retired from our practice.  Again, called Dr Pate office and told them to call Dr. Lara's office at [facility].

## 2024-11-18 ENCOUNTER — OFFICE VISIT (OUTPATIENT)
Dept: FAMILY MEDICINE CLINIC | Facility: CLINIC | Age: 89
End: 2024-11-18
Payer: MEDICARE

## 2024-11-18 VITALS
HEART RATE: 74 BPM | OXYGEN SATURATION: 96 % | RESPIRATION RATE: 18 BRPM | DIASTOLIC BLOOD PRESSURE: 52 MMHG | BODY MASS INDEX: 22.79 KG/M2 | SYSTOLIC BLOOD PRESSURE: 118 MMHG | TEMPERATURE: 98.1 F | HEIGHT: 63 IN | WEIGHT: 128.6 LBS

## 2024-11-18 DIAGNOSIS — I73.9 LOWER EXTREMITY ARTERIAL INSUFFICIENCY, SEVERE, LEFT: ICD-10-CM

## 2024-11-18 DIAGNOSIS — I10 PRIMARY HYPERTENSION: ICD-10-CM

## 2024-11-18 DIAGNOSIS — K21.9 GASTROESOPHAGEAL REFLUX DISEASE, UNSPECIFIED WHETHER ESOPHAGITIS PRESENT: ICD-10-CM

## 2024-11-18 DIAGNOSIS — L03.116 CELLULITIS OF LEFT LOWER EXTREMITY: ICD-10-CM

## 2024-11-18 DIAGNOSIS — L89.622 PRESSURE ULCER OF LEFT HEEL, STAGE 2: Primary | ICD-10-CM

## 2024-11-18 PROCEDURE — 99214 OFFICE O/P EST MOD 30 MIN: CPT | Performed by: FAMILY MEDICINE

## 2024-11-18 PROCEDURE — G2211 COMPLEX E/M VISIT ADD ON: HCPCS | Performed by: FAMILY MEDICINE

## 2024-11-18 PROCEDURE — 1125F AMNT PAIN NOTED PAIN PRSNT: CPT | Performed by: FAMILY MEDICINE

## 2024-11-18 NOTE — PROGRESS NOTES
Chief Complaint  [facility]    Subjective         Cat Dickinson presents to [facility]  History of Present Illness    89-year-old female previously patient of Dr. Mejia presents today for routine follow-up  Patient is accompanied by her son and her     Patient has hypertension: currently on spironolactone and diltiazem.  Patient denies chest pain or shortness of breath.  She does have chronic bilateral lower extremity swelling.    GERD: Currently on omeprazole 40 mg and Pepcid 20 mg as needed      Patient's son notes that patient was recently diagnosed with left heel pressure ulcer and left lower extremity cellulitis and poor left lower extremity arterial insufficiency with abnormal ankle-brachial index.    Patient was initially started on Keflex.  Patient symptoms worsened so she returned to ER on 11/10/2024.  Workup was significant for elevated CRP and white count.  X-ray was negative for osteomyelitis.  Patient was started on a second course of Keflex.    The son notes, ulcer has been slowly healing, and cellulitis has improved.  She is on her last day of Keflex today.  Patient denies fevers or chills or pain      Patient does have an appointment with vascular surgery tomorrow.  She used to be on aspirin and statin medication but was discontinued by her previous primary care.        Objective     Review of Systems     Past Medical History:   Diagnosis Date    Dementia     Hypertension     Kidney stones         Current Outpatient Medications:     Ascorbic Acid (Vitamin C) 500 MG capsule, Take 500 mg by mouth Daily., Disp: , Rfl:     cephalexin (KEFLEX) 500 MG capsule, Take 2 capsules by mouth 2 (Two) Times a Day for 7 days., Disp: 28 capsule, Rfl: 0    dilTIAZem CD (Cardizem CD) 180 MG 24 hr capsule, Take 1 capsule by mouth Daily., Disp: 90 capsule, Rfl: 3    donepezil (ARICEPT) 5 MG tablet, Take 1 tablet by mouth Daily With Lunch., Disp: 90 tablet, Rfl: 3     "famotidine (PEPCID) 20 MG tablet, Take 1 tablet by mouth., Disp: , Rfl:     pantoprazole (PROTONIX) 40 MG EC tablet, Take 1 tablet by mouth Every Morning Before Breakfast., Disp: 90 tablet, Rfl: 3    pyridoxine (VITAMIN B-6) 100 MG tablet, Take 1 tablet by mouth Daily., Disp: , Rfl:     Selenium 200 MCG tablet, Take  by mouth., Disp: , Rfl:     spironolactone (ALDACTONE) 25 MG tablet, Take 0.5 tablets by mouth Daily., Disp: 45 tablet, Rfl: 3    vitamin B-12 (CYANOCOBALAMIN) 1000 MCG tablet, Take 1 tablet by mouth Daily., Disp: , Rfl:     Vitamin D, Cholecalciferol, (CHOLECALCIFEROL) 10 MCG (400 UNIT) tablet, Take 1 tablet by mouth Daily., Disp: , Rfl:     naproxen (NAPROSYN) 250 MG tablet, Take 1 tablet by mouth 2 (Two) Times a Day With Meals. (Patient not taking: Reported on 11/18/2024), Disp: 8 tablet, Rfl: 0    pravastatin (PRAVACHOL) 20 MG tablet, Take 1 tablet by mouth Daily. (Patient not taking: Reported on 11/18/2024), Disp: 90 tablet, Rfl: 3   Social History     Socioeconomic History    Marital status:    Tobacco Use    Smoking status: Never     Passive exposure: Never    Smokeless tobacco: Never   Vaping Use    Vaping status: Never Used   Substance and Sexual Activity    Alcohol use: Never    Drug use: Never      Vital Signs:   /52 (BP Location: Right arm, Patient Position: Sitting, Cuff Size: Adult)   Pulse 74   Temp 98.1 °F (36.7 °C)   Resp 18   Ht 160 cm (63\")   Wt 58.3 kg (128 lb 9.6 oz)   SpO2 96%   BMI 22.78 kg/m²       Physical Exam  Constitutional:       General: She is not in acute distress.     Appearance: She is not ill-appearing.   Cardiovascular:      Rate and Rhythm: Normal rate.      Heart sounds: Murmur heard.   Pulmonary:      Effort: Pulmonary effort is normal.      Breath sounds: Normal breath sounds.   Musculoskeletal:      Right lower leg: Edema present.      Left lower leg: Edema present.      Comments: Stage II left heel ulcer, no redness or purulent discharge, " healing well.   Skin:     Comments: Moderate left lower extremity erythema and swelling, no tenderness.   Psychiatric:         Mood and Affect: Mood normal.         Behavior: Behavior normal.          Result Review :                    Latest Reference Range & Units 11/10/24 17:45   Sodium 136 - 145 mmol/L 136   Potassium 3.5 - 5.2 mmol/L 4.3   Chloride 98 - 107 mmol/L 103   CO2 22.0 - 29.0 mmol/L 24.0   Anion Gap 5.0 - 15.0 mmol/L 9.0   BUN 8 - 23 mg/dL 28 (H)   Creatinine 0.57 - 1.00 mg/dL 1.41 (H)   BUN/Creatinine Ratio 7.0 - 25.0  19.9   eGFR >60.0 mL/min/1.73 35.5 (L)   Glucose 65 - 99 mg/dL 116 (H)   Calcium 8.2 - 9.6 mg/dL 10.2 (H)   Alkaline Phosphatase 39 - 117 U/L 110   Total Protein 6.0 - 8.5 g/dL 6.6   Albumin 3.5 - 5.2 g/dL 3.8   Globulin gm/dL 2.8   A/G Ratio g/dL 1.4   AST (SGOT) 1 - 32 U/L 19   ALT (SGPT) 1 - 33 U/L 21   Total Bilirubin 0.0 - 1.2 mg/dL 0.2   C-Reactive Protein 0.00 - 0.50 mg/dL 6.39 (H)   WBC 3.40 - 10.80 10*3/mm3 20.89 (H)   RBC 3.77 - 5.28 10*6/mm3 3.22 (L)   Hemoglobin 12.0 - 15.9 g/dL 10.0 (L)   Hematocrit 34.0 - 46.6 % 29.9 (L)   Platelets 140 - 450 10*3/mm3 275   RDW 12.3 - 15.4 % 11.8 (L)   MCV 79.0 - 97.0 fL 92.9   MCH 26.6 - 33.0 pg 31.1   MCHC 31.5 - 35.7 g/dL 33.4   MPV 6.0 - 12.0 fL 10.0   RDW-SD 37.0 - 54.0 fl 39.8   Neutrophil Rel % 42.7 - 76.0 % 80.2 (H)   Lymphocyte Rel % 19.6 - 45.3 % 8.7 (L)   Monocyte Rel % 5.0 - 12.0 % 8.7   Eosinophil Rel % 0.3 - 6.2 % 0.8   Basophil Rel % 0.0 - 1.5 % 0.5   Immature Granulocyte Rel % 0.0 - 0.5 % 1.1 (H)   Neutrophils Absolute 1.70 - 7.00 10*3/mm3 16.77 (H)   Lymphocytes Absolute 0.70 - 3.10 10*3/mm3 1.81   Monocytes Absolute 0.10 - 0.90 10*3/mm3 1.82 (H)   Eosinophils Absolute 0.00 - 0.40 10*3/mm3 0.17   Basophils Absolute 0.00 - 0.20 10*3/mm3 0.10   Immature Grans, Absolute 0.00 - 0.05 10*3/mm3 0.22 (H)   nRBC 0.0 - 0.2 /100 WBC 0.0   Sed Rate 0 - 30 mm/hr 21   (H): Data is abnormally high  (L): Data is abnormally  low    11/5/2024, Doppler ankle-brachial index  IMPRESSION:   Ankle brachial indices measuring up to 0.92 on the right and 0.22 on the left. Findings suggesting severe arterial disease on the left and borderline acceptable levels on the right.       Narrative & Impression   XR CALCANEUS 2+ VW LEFT-     DATE OF EXAM: 11/10/2024 5:34 PM     INDICATION: ulcer.     COMPARISON: None available.     TECHNIQUE: 2 views of the calcaneus were obtained.     FINDINGS:  No evidence of acute fracture or dislocation. No lytic or destructive  osseous changes to suggest osteomyelitis. The tibiotalar and subtalar  joint spaces are fairly well-maintained. Soft tissue swelling at the  posterior and plantar hindfoot.     IMPRESSION:  Soft tissue swelling, without radiographic evidence of acute fracture,  dislocation, or osteomyelitis.     This report was finalized on 11/10/2024 5:59 PM by Sigifredo Bryan MD       11/10/2024 duplex venous lower extremity  Study Impression         Right Common Femoral:  No deep vein thrombosis noted.          Left Common Femoral: No deep vein thrombosis noted.        Left Saphenofemoral Junction: No superficial thrombophlebitis noted.        Left Proximal Femoral: No deep vein thrombosis noted.        Left Mid Femoral: No deep vein thrombosis noted.        Left Distal Femoral: No deep vein thrombosis noted.        Left Popliteal: No deep vein thrombosis noted.        Left Posterior Tibial: No deep vein thrombosis noted.        Left Peroneal: No deep vein thrombosis noted.        Left Gastrocnemius: No deep vein thrombosis noted.         Left Great Saphenous Above Knee: No superficial thrombophlebitis noted.        Left Great Saphenous Below Knee: No superficial thrombophlebitis noted.     Assessment and Plan    Diagnoses and all orders for this visit:    1. Pressure ulcer of left heel, stage 2 (Primary)  -     Ambulatory Referral to Wound Clinic    2. Primary hypertension    3. Lower extremity arterial  insufficiency, severe, left    4. Cellulitis of left lower extremity    5. Gastroesophageal reflux disease, unspecified whether esophagitis present      Primary hypertension  Blood pressure well-controlled today  Continue diltiazem 180 mg and spironolactone 12.5 mg    Left lower extremity arterial insufficiency  Patient is referred to vascular surgery, appointment tomorrow  Patient needs to be on antiplatelet treatment and statin treatment, will wait for vascular surgery recommendations tomorrow    Pressure ulcer of left heel stage II  This seems to be healing well  I will refer patient to wound care for further evaluation  Discussed with patient necessity of avoid keeping pressure on her heels for prolonged time    Left lower extremity cellulitis  Likely secondary to her heel ulcer  Patient completed 2 courses of antibiotic Keflex  This seems to be improving  I will hold off on antibiotic for now    GERD/dyspepsia  Continue pantoprazole 40 mg daily and Pepcid as needed      Follow Up   No follow-ups on file.  Patient was given instructions and counseling regarding her condition or for health maintenance advice. Please see specific information pulled into the AVS if appropriate.

## 2024-11-19 ENCOUNTER — TELEPHONE (OUTPATIENT)
Dept: FAMILY MEDICINE CLINIC | Facility: CLINIC | Age: 89
End: 2024-11-19
Payer: MEDICARE

## 2024-12-02 ENCOUNTER — OFFICE VISIT (OUTPATIENT)
Dept: FAMILY MEDICINE CLINIC | Facility: CLINIC | Age: 89
End: 2024-12-02
Payer: MEDICARE

## 2024-12-02 VITALS
BODY MASS INDEX: 22.61 KG/M2 | RESPIRATION RATE: 18 BRPM | HEART RATE: 76 BPM | TEMPERATURE: 97.5 F | SYSTOLIC BLOOD PRESSURE: 108 MMHG | WEIGHT: 127.6 LBS | DIASTOLIC BLOOD PRESSURE: 58 MMHG | HEIGHT: 63 IN | OXYGEN SATURATION: 96 %

## 2024-12-02 DIAGNOSIS — L97.429 CHRONIC HEEL ULCER, LEFT, WITH UNSPECIFIED SEVERITY: ICD-10-CM

## 2024-12-02 DIAGNOSIS — I74.3: Primary | ICD-10-CM

## 2024-12-02 PROCEDURE — 93000 ELECTROCARDIOGRAM COMPLETE: CPT | Performed by: FAMILY MEDICINE

## 2024-12-02 PROCEDURE — 1125F AMNT PAIN NOTED PAIN PRSNT: CPT | Performed by: FAMILY MEDICINE

## 2024-12-02 PROCEDURE — 99214 OFFICE O/P EST MOD 30 MIN: CPT | Performed by: FAMILY MEDICINE

## 2024-12-02 RX ORDER — IRBESARTAN 300 MG/1
300 TABLET ORAL NIGHTLY
COMMUNITY

## 2024-12-02 NOTE — PROGRESS NOTES
Chief Complaint  Follow-up and Foot Pain    Subjective         Cat Dickinson presents to [facility]  History of Present Illness    89-year-old female has hypertension and hyperlipidemia presents today for left heel ulcer follow-up.  Workup was significant for low JOSE ROBERTO of 0.2.  CTA on 11/19/2024 showed atherosclerotic disease predominantly in the mid to distal segment of the right and left superficial femoral artery and in the runoff arteries.  Vascular surgery recommended left lower extremity angiogram with angioplasty, on 11/22/2024 patient underwent angiogram with angioplasty.      Patient son reports that during the angiogram there was a clot without plaque formation.  The surgeon discharge patient on Eliquis and recommended cardiology follow-up     Today patient states she is not sure if her heel ulcer is improving or not.  She denies fevers or chills.  She has mild to moderate pain over the ulcer location and also complains of burning pain of her left great toe and foot.  Patient has dementia and is a very poor historian.    The patient's  notes that the patient complained of abdominal pain 1 day and nausea without vomiting and he was concerned about ischemia but her symptoms resolved quickly before he gave her nitroglycerin.  Patient denies chest pain or shortness of breath or palpitations      Objective     Review of Systems     Past Medical History:   Diagnosis Date    Dementia     Hypertension     Kidney stones         Current Outpatient Medications:     apixaban (ELIQUIS) 5 MG tablet tablet, Take 1 tablet by mouth., Disp: , Rfl:     Ascorbic Acid (Vitamin C) 500 MG capsule, Take 500 mg by mouth Daily., Disp: , Rfl:     dilTIAZem CD (Cardizem CD) 180 MG 24 hr capsule, Take 1 capsule by mouth Daily., Disp: 90 capsule, Rfl: 3    donepezil (ARICEPT) 5 MG tablet, Take 1 tablet by mouth Daily With Lunch., Disp: 90 tablet, Rfl: 3    famotidine (PEPCID) 20 MG tablet, Take 1  "tablet by mouth., Disp: , Rfl:     irbesartan (AVAPRO) 300 MG tablet, Take 1 tablet by mouth Every Night., Disp: , Rfl:     pantoprazole (PROTONIX) 40 MG EC tablet, Take 1 tablet by mouth Every Morning Before Breakfast., Disp: 90 tablet, Rfl: 3    pyridoxine (VITAMIN B-6) 100 MG tablet, Take 1 tablet by mouth Daily., Disp: , Rfl:     Selenium 200 MCG tablet, Take  by mouth., Disp: , Rfl:     spironolactone (ALDACTONE) 25 MG tablet, Take 0.5 tablets by mouth Daily., Disp: 45 tablet, Rfl: 3    vitamin B-12 (CYANOCOBALAMIN) 1000 MCG tablet, Take 1 tablet by mouth Daily., Disp: , Rfl:     Vitamin D, Cholecalciferol, (CHOLECALCIFEROL) 10 MCG (400 UNIT) tablet, Take 1 tablet by mouth Daily., Disp: , Rfl:     naproxen (NAPROSYN) 250 MG tablet, Take 1 tablet by mouth 2 (Two) Times a Day With Meals. (Patient not taking: Reported on 12/2/2024), Disp: 8 tablet, Rfl: 0    pravastatin (PRAVACHOL) 20 MG tablet, Take 1 tablet by mouth Daily. (Patient not taking: Reported on 8/23/2024), Disp: 90 tablet, Rfl: 3   Social History     Socioeconomic History    Marital status:    Tobacco Use    Smoking status: Never     Passive exposure: Never    Smokeless tobacco: Never   Vaping Use    Vaping status: Never Used   Substance and Sexual Activity    Alcohol use: Never    Drug use: Never      Vital Signs:   /58   Pulse 76   Temp 97.5 °F (36.4 °C)   Resp 18   Ht 160 cm (63\")   Wt 57.9 kg (127 lb 9.6 oz)   SpO2 96%   BMI 22.60 kg/m²       Physical Exam  Musculoskeletal:      Comments: Left heel : left lateral heel ulcer, yellowish/white center, no surrounding erythema, mild tenderness to palpation, no increased warmness.    Left great toe:  has old scabbed wound, no signs of infection, no increased warmness or erythema, has brisk cap refill.            Result Review :                   Arterial Doppler 11/5/2024  FINDINGS:  Resting ankle brachial indices are as follows:  Dorsalis pedis JOSE ROBERTO: 0.89 on the right and 0.22 on the " left.  Posterior tibial JOSE ROBERTO: 0.92 on the right and 0.20 on the left.  Right and left brachial pressures are equivalent.    IMPRESSION:  Ankle brachial indices measuring up to 0.92 on the right and 0.22 on the left. Findings suggesting severe arterial disease on the left and borderline acceptable levels on the right.         CT angio 11/19/2024  IMPRESSION:   No evidence for abdominal aortic aneurysm or dissection.     Several small rim calcified splenic artery aneurysms.     Atherosclerotic disease predominantly in the mid to distal segments of the right and left superficial femoral artery and in the runoff arteries (greatest involvement of bilateral posterior tibial arteries).     Nonspecific heterogeneous enhancement pattern of the right kidney. This could be vascular. Pyelonephritis or multiple small infarcts of uncertain chronicity could potentially have this appearance.     Indeterminate lesion in the lower right kidney. Consider evaluation with renal protocol MRI or CT.       In office EKG  Sinus rhythm with first-degree block, KS interval 220.  No ischemic ST or T wave changes, normal axis, QTc 379  No previous EKG available for comparison    Assessment and Plan    Diagnoses and all orders for this visit:    1. 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 Chronic heel ulcer, left, with unspecified severity      Left lower extremity artery embolism  Appears the patient symptoms were due to thromboembolism of unknown source  Check echocardiogram  Check Holter monitor  Refer to cardiology  Continue Eliquis    Stage II chronic left heel ulcer  No signs of infection on exam today  Encouraged to call and follow-up with wound care      Follow Up   No follow-ups on file.  Patient was given instructions and  counseling regarding her condition or for health maintenance advice. Please see specific information pulled into the AVS if appropriate.

## 2024-12-10 ENCOUNTER — OFFICE VISIT (OUTPATIENT)
Dept: WOUND CARE | Facility: HOSPITAL | Age: 89
End: 2024-12-10
Payer: MEDICARE

## 2024-12-12 ENCOUNTER — TELEPHONE (OUTPATIENT)
Dept: CARDIOLOGY | Facility: CLINIC | Age: 89
End: 2024-12-12
Payer: MEDICARE

## 2024-12-12 NOTE — TELEPHONE ENCOUNTER
Hub staff attempted to follow warm transfer process and was unsuccessful     Caller: MARIS MAZARIEGOS    Relationship to patient: Emergency Contact    Best call back number: [phone]     Patient is needing: PT RECEIVED CALL TO SCHEDULE ON A REFERRAL AND WAS CONFUSED AS SHE THOUGHT SHE WAS ALREADY SCHEDULED, SHE DID NOT REALIZE IT WAS MEANT FOR CONSULT FU FOR TESTING - PT SON ASKING FOR CALL BACK TO SCHEDULE AS HE HAS TRIED SEVERAL TIMES TO GET THROUGH TO RETURN CALL - HE STATES THE CALL CAME FROM A JUAN

## 2024-12-16 ENCOUNTER — HOSPITAL ENCOUNTER (OUTPATIENT)
Dept: CARDIOLOGY | Facility: HOSPITAL | Age: 89
Discharge: HOME | End: 2024-12-16
Payer: MEDICARE

## 2024-12-16 VITALS
BODY MASS INDEX: 22.5 KG/M2 | WEIGHT: 127 LBS | DIASTOLIC BLOOD PRESSURE: 70 MMHG | HEIGHT: 63 IN | SYSTOLIC BLOOD PRESSURE: 140 MMHG

## 2024-12-16 DIAGNOSIS — I74.3: ICD-10-CM

## 2024-12-16 LAB
AORTIC ARCH: 2.8 CM
ASCENDING AORTA: 2.9 CM
BH CV ECHO MEAS - ACS: 1.52 CM
BH CV ECHO MEAS - AI P1/2T: 611.2 MSEC
BH CV ECHO MEAS - AO MAX PG: 8.3 MMHG
BH CV ECHO MEAS - AO MEAN PG: 5 MMHG
BH CV ECHO MEAS - AO ROOT DIAM: 3.1 CM
BH CV ECHO MEAS - AO V2 MAX: 144.4 CM/SEC
BH CV ECHO MEAS - AO V2 VTI: 34.8 CM
BH CV ECHO MEAS - AVA(I,D): 2.06 CM2
BH CV ECHO MEAS - EDV(CUBED): 67.1 ML
BH CV ECHO MEAS - EDV(MOD-SP2): 80 ML
BH CV ECHO MEAS - EDV(MOD-SP4): 68 ML
BH CV ECHO MEAS - EF(MOD-BP): 68.8 %
BH CV ECHO MEAS - EF(MOD-SP2): 67.5 %
BH CV ECHO MEAS - EF(MOD-SP4): 67.6 %
BH CV ECHO MEAS - ESV(CUBED): 20.1 ML
BH CV ECHO MEAS - ESV(MOD-SP2): 26 ML
BH CV ECHO MEAS - ESV(MOD-SP4): 22 ML
BH CV ECHO MEAS - FS: 33.1 %
BH CV ECHO MEAS - IVS/LVPW: 1.04 CM
BH CV ECHO MEAS - IVSD: 0.82 CM
BH CV ECHO MEAS - LAT PEAK E' VEL: 7.7 CM/SEC
BH CV ECHO MEAS - LV DIASTOLIC VOL/BSA (35-75): 42.6 CM2
BH CV ECHO MEAS - LV MASS(C)D: 97.3 GRAMS
BH CV ECHO MEAS - LV MAX PG: 6.2 MMHG
BH CV ECHO MEAS - LV MEAN PG: 2.8 MMHG
BH CV ECHO MEAS - LV SYSTOLIC VOL/BSA (12-30): 13.8 CM2
BH CV ECHO MEAS - LV V1 MAX: 124.6 CM/SEC
BH CV ECHO MEAS - LV V1 VTI: 26.5 CM
BH CV ECHO MEAS - LVIDD: 4.1 CM
BH CV ECHO MEAS - LVIDS: 2.7 CM
BH CV ECHO MEAS - LVOT AREA: 2.7 CM2
BH CV ECHO MEAS - LVOT DIAM: 1.86 CM
BH CV ECHO MEAS - LVPWD: 0.79 CM
BH CV ECHO MEAS - MED PEAK E' VEL: 7 CM/SEC
BH CV ECHO MEAS - MV A DUR: 0.13 SEC
BH CV ECHO MEAS - MV A MAX VEL: 83.8 CM/SEC
BH CV ECHO MEAS - MV DEC SLOPE: 371.3 CM/SEC2
BH CV ECHO MEAS - MV DEC TIME: 0.21 SEC
BH CV ECHO MEAS - MV E MAX VEL: 74.9 CM/SEC
BH CV ECHO MEAS - MV E/A: 0.89
BH CV ECHO MEAS - MV MAX PG: 5.3 MMHG
BH CV ECHO MEAS - MV MEAN PG: 2.26 MMHG
BH CV ECHO MEAS - MV P1/2T: 69.6 MSEC
BH CV ECHO MEAS - MV V2 VTI: 27.1 CM
BH CV ECHO MEAS - MVA(P1/2T): 3.2 CM2
BH CV ECHO MEAS - MVA(VTI): 2.7 CM2
BH CV ECHO MEAS - PA ACC TIME: 0.14 SEC
BH CV ECHO MEAS - PA V2 MAX: 89.8 CM/SEC
BH CV ECHO MEAS - PULM A REVS DUR: 0.11 SEC
BH CV ECHO MEAS - PULM A REVS VEL: 28.8 CM/SEC
BH CV ECHO MEAS - PULM DIAS VEL: 29.3 CM/SEC
BH CV ECHO MEAS - PULM S/D: 2.8
BH CV ECHO MEAS - PULM SYS VEL: 80.7 CM/SEC
BH CV ECHO MEAS - QP/QS: 0.7
BH CV ECHO MEAS - RV MAX PG: 3 MMHG
BH CV ECHO MEAS - RV V1 MAX: 87 CM/SEC
BH CV ECHO MEAS - RV V1 VTI: 18.5 CM
BH CV ECHO MEAS - RVOT DIAM: 1.86 CM
BH CV ECHO MEAS - SV(LVOT): 71.9 ML
BH CV ECHO MEAS - SV(MOD-SP2): 54 ML
BH CV ECHO MEAS - SV(MOD-SP4): 46 ML
BH CV ECHO MEAS - SV(RVOT): 50.2 ML
BH CV ECHO MEAS - SVI(LVOT): 45.1 ML/M2
BH CV ECHO MEAS - SVI(MOD-SP2): 33.9 ML/M2
BH CV ECHO MEAS - SVI(MOD-SP4): 28.9 ML/M2
BH CV ECHO MEAS - TAPSE (>1.6): 1.72 CM
BH CV ECHO MEAS - TR MAX PG: 19 MMHG
BH CV ECHO MEAS - TR MAX VEL: 218.2 CM/SEC
BH CV ECHO MEASUREMENTS AVERAGE E/E' RATIO: 10.19
BH CV ECHO SHUNT ASSESSMENT PERFORMED (HIDDEN SCRIPTING): 1
BH CV XLRA - RV BASE: 2.6 CM
BH CV XLRA - RV LENGTH: 5 CM
BH CV XLRA - RV MID: 2.28 CM
BH CV XLRA - TDI S': 10.4 CM/SEC
LEFT ATRIUM VOLUME INDEX: 26.9 ML/M2
SINUS: 2.7 CM
STJ: 2.42 CM

## 2024-12-16 PROCEDURE — 93246 EXT ECG>7D<15D RECORDING: CPT

## 2024-12-16 PROCEDURE — 25510000001 PERFLUTREN 6.52 MG/ML SUSPENSION 2 ML VIAL: Performed by: FAMILY MEDICINE

## 2024-12-16 PROCEDURE — 93306 TTE W/DOPPLER COMPLETE: CPT

## 2024-12-16 RX ADMIN — PERFLUTREN 2 ML: 6.52 INJECTION, SUSPENSION INTRAVENOUS at 09:21

## 2024-12-19 NOTE — TELEPHONE ENCOUNTER
I spoke with patient's son on Monday 12/16 as well as today. Patient has been scheduled.     Blaire

## 2025-01-07 ENCOUNTER — OFFICE VISIT (OUTPATIENT)
Dept: WOUND CARE | Facility: HOSPITAL | Age: OVER 89
End: 2025-01-07
Payer: MEDICARE

## 2025-01-07 LAB
CV ZIO BASELINE AVG BPM: 75 BPM
CV ZIO BASELINE BPM HIGH: 164 BPM
CV ZIO BASELINE BPM LOW: 30 BPM
CV ZIO DEVICE ANALYSIS TIME: NORMAL
CV ZIO ECT SVE COUNT: 1318 EPISODES
CV ZIO ECT SVE CPLT COUNT: 43 EPISODES
CV ZIO ECT SVE CPLT FREQ: NORMAL
CV ZIO ECT SVE FREQ: NORMAL
CV ZIO ECT SVE TPLT COUNT: 1 EPISODES
CV ZIO ECT SVE TPLT FREQ: NORMAL
CV ZIO ECT VE COUNT: 40 EPISODES
CV ZIO ECT VE CPLT COUNT: 1 EPISODES
CV ZIO ECT VE CPLT FREQ: NORMAL
CV ZIO ECT VE FREQ: NORMAL
CV ZIO ECT VE TPLT COUNT: 0 EPISODES
CV ZIO ECT VE TPLT FREQ: 0
CV ZIO ECTOPIC SVE COUPLET RAW PERCENT: 0.01 %
CV ZIO ECTOPIC SVE ISOLATED PERCENT: 0.09 %
CV ZIO ECTOPIC SVE TRIPLET RAW PERCENT: 0 %
CV ZIO ECTOPIC VE COUPLET RAW PERCENT: 0 %
CV ZIO ECTOPIC VE ISOLATED PERCENT: 0 %
CV ZIO ECTOPIC VE TRIPLET RAW PERCENT: 0 %
CV ZIO ENROLLMENT END: NORMAL
CV ZIO ENROLLMENT START: NORMAL
CV ZIO PATIENT EVENTS DIARIES: 0
CV ZIO PATIENT EVENTS TRIGGERS: 0
CV ZIO PAUSE COUNT: 0
CV ZIO PRESCRIPTION STATUS: NORMAL
CV ZIO SVT AVG BPM: 134 BPM
CV ZIO SVT BPM HIGH: 164 BPM
CV ZIO SVT BPM LOW: 117 BPM
CV ZIO SVT COUNT: 5
CV ZIO SVT F EPI AVG BPM: 159 BPM
CV ZIO SVT F EPI BEATS: 4 BEATS
CV ZIO SVT F EPI BPM HIGH: 164 BPM
CV ZIO SVT F EPI BPM LOW: 152 BPM
CV ZIO SVT F EPI DUR: 1.7 SEC
CV ZIO SVT F EPI END: NORMAL
CV ZIO SVT F EPI START: NORMAL
CV ZIO SVT L EPI AVG BPM: 130 BPM
CV ZIO SVT L EPI BEATS: 7 BEATS
CV ZIO SVT L EPI BPM HIGH: 136 BPM
CV ZIO SVT L EPI BPM LOW: 117 BPM
CV ZIO SVT L EPI DUR: 3.1 SEC
CV ZIO SVT L EPI END: NORMAL
CV ZIO SVT L EPI START: NORMAL
CV ZIO TOTAL  ENROLLMENT PERIOD: NORMAL
CV ZIO VT COUNT: 0

## 2025-01-16 ENCOUNTER — CLINICAL SUPPORT (OUTPATIENT)
Dept: FAMILY MEDICINE CLINIC | Facility: CLINIC | Age: OVER 89
End: 2025-01-16
Payer: MEDICARE

## 2025-01-16 DIAGNOSIS — Z23 IMMUNIZATION DUE: Primary | ICD-10-CM

## 2025-01-22 ENCOUNTER — OFFICE VISIT (OUTPATIENT)
Dept: CARDIOLOGY | Facility: CLINIC | Age: OVER 89
End: 2025-01-22
Payer: MEDICARE

## 2025-01-22 VITALS
BODY MASS INDEX: 22.32 KG/M2 | DIASTOLIC BLOOD PRESSURE: 60 MMHG | HEIGHT: 63 IN | HEART RATE: 70 BPM | SYSTOLIC BLOOD PRESSURE: 132 MMHG | WEIGHT: 126 LBS

## 2025-01-22 DIAGNOSIS — I73.9 PVD (PERIPHERAL VASCULAR DISEASE) WITH CLAUDICATION: Primary | ICD-10-CM

## 2025-01-22 RX ORDER — ASPIRIN 81 MG/1
81 TABLET, CHEWABLE ORAL DAILY
COMMUNITY

## 2025-01-22 NOTE — PROGRESS NOTES
Date of Office Visit: 25  Encounter Provider: Wes Buckley MD  Place of Service: [facility]  Patient Name: Cat Dickinson  :1933  [id number]    CC:ZULEIMA       HPI: Cat Dickinson is a 89 y.o. female she comes here as a new patient today I take care of her .  She had had 2 to 3-month history of leg pain difficulty walking with the got seen was diagnosed with reduced blood flow in the left leg ended up seeing a vascular surgeon at [facility] underwent an angiography angioplasty and stenting it sounds like him distal left SFA.  There family is saying that the vascular surgeon said it was a clot and that it probably came from somewhere else.  The angiogram report reports moderate atherosclerosis in the SFA but then a focal occlusion in the distal left SFA she has never had palpitations she has never had PND orthopnea edema no stroke symptoms she has a little bit of a early cognitive decline she had been on a statin but that got stopped.  She has not had any bleeding difficulty but she does have what appears to be a hematoma on her left leg from hitting it and it does hurt her is not warm or red    Past Medical History:   Diagnosis Date    Dementia     Hypertension     Kidney stones        Past Surgical History:   Procedure Laterality Date    KIDNEY STONE SURGERY         Social History     Socioeconomic History    Marital status:     Number of children: 2   Tobacco Use    Smoking status: Never     Passive exposure: Never    Smokeless tobacco: Never    Tobacco comments:     Caffeine: occ stephon   Vaping Use    Vaping status: Never Used   Substance and Sexual Activity    Alcohol use: Never    Drug use: Never    Sexual activity: Defer       Family History   Problem Relation Age of Onset    Cancer Mother 43        passed    Cancer Father     Heart disease Sister        Review of Systems   Constitutional: Negative for decreased appetite, fever, malaise/fatigue and  weight loss.   HENT:  Negative for nosebleeds.    Eyes:  Negative for double vision.   Cardiovascular:  Negative for chest pain, claudication, cyanosis, dyspnea on exertion, irregular heartbeat, leg swelling, near-syncope, orthopnea, palpitations, paroxysmal nocturnal dyspnea and syncope.   Respiratory:  Negative for cough, hemoptysis and shortness of breath.    Hematologic/Lymphatic: Negative for bleeding problem.   Skin:  Negative for rash.   Musculoskeletal:  Negative for falls and myalgias.   Gastrointestinal:  Negative for hematochezia, jaundice, melena, nausea and vomiting.   Genitourinary:  Negative for hematuria.   Neurological:  Negative for dizziness and seizures.   Psychiatric/Behavioral:  Negative for altered mental status and memory loss.        Allergies   Allergen Reactions    Hydrochlorothiazide Unknown - High Severity     Outside Source Comment: Annotation - [id number]: hypercalcemia    Annotation - [id number]: hypercalcemia    Lovastatin Dizziness     Dizziness         Current Outpatient Medications:     apixaban (ELIQUIS) 5 MG tablet tablet, Take 1 tablet by mouth., Disp: , Rfl:     Ascorbic Acid (Vitamin C) 500 MG capsule, Take 500 mg by mouth Daily., Disp: , Rfl:     aspirin 81 MG chewable tablet, Chew 1 tablet Daily., Disp: , Rfl:     dilTIAZem CD (Cardizem CD) 180 MG 24 hr capsule, Take 1 capsule by mouth Daily., Disp: 90 capsule, Rfl: 3    donepezil (ARICEPT) 5 MG tablet, Take 1 tablet by mouth Daily With Lunch., Disp: 90 tablet, Rfl: 3    famotidine (PEPCID) 20 MG tablet, Take 1 tablet by mouth., Disp: , Rfl:     irbesartan (AVAPRO) 300 MG tablet, Take 1 tablet by mouth Every Night., Disp: , Rfl:     pantoprazole (PROTONIX) 40 MG EC tablet, Take 1 tablet by mouth Every Morning Before Breakfast., Disp: 90 tablet, Rfl: 3    pyridoxine (VITAMIN B-6) 100 MG tablet, Take 1 tablet by mouth Daily., Disp: , Rfl:     Selenium 200 MCG tablet, Take  by mouth., Disp: , Rfl:     spironolactone (ALDACTONE)  "25 MG tablet, Take 0.5 tablets by mouth Daily., Disp: 45 tablet, Rfl: 3    vitamin B-12 (CYANOCOBALAMIN) 1000 MCG tablet, Take 1 tablet by mouth Daily., Disp: , Rfl:     Vitamin D, Cholecalciferol, (CHOLECALCIFEROL) 10 MCG (400 UNIT) tablet, Take 1 tablet by mouth Daily., Disp: , Rfl:     naproxen (NAPROSYN) 250 MG tablet, Take 1 tablet by mouth 2 (Two) Times a Day With Meals. (Patient not taking: Reported on 11/18/2024), Disp: 8 tablet, Rfl: 0    pravastatin (PRAVACHOL) 20 MG tablet, Take 1 tablet by mouth Daily. (Patient not taking: Reported on 1/22/2025), Disp: 90 tablet, Rfl: 3      Objective:     Vitals:    01/22/25 1055   BP: 132/60   Pulse: 70   Weight: 57.2 kg (126 lb)   Height: 160 cm (63\")     Body mass index is 22.32 kg/m².    Constitutional:       Appearance: Well-developed.   Eyes:      General: No scleral icterus.  HENT:      Head: Normocephalic.   Neck:      Thyroid: No thyromegaly.      Vascular: No JVD.      Lymphadenopathy: No cervical adenopathy.   Pulmonary:      Effort: Pulmonary effort is normal.      Breath sounds: Normal breath sounds. No wheezing. No rales.   Cardiovascular:      Normal rate. Regular rhythm.      No gallop.    Edema:     Peripheral edema absent.   Abdominal:      Palpations: Abdomen is soft.      Tenderness: There is no abdominal tenderness.   Musculoskeletal: Normal range of motion. Skin:     General: Skin is warm and dry.      Findings: No rash.   Neurological:      Mental Status: Alert and oriented to person, place, and time.           ECG 12 Lead    Date/Time: 1/22/2025 11:15 AM  Performed by: Wes Buckley MD    Authorized by: Tessa Elmore MD  Comparison: not compared with previous ECG   Rhythm: sinus rhythm    Clinical impression: normal ECG           Assessment:       Diagnosis Plan   1. PVD (peripheral vascular disease) with claudication               Plan:       It is possible she had an embolic event to her leg we have not seen any A-fib her echo otherwise " is pretty much normal she is on Eliquis and a baby aspirin I am wondering about the dose on her Eliquis she is 89 and weighs 126 pounds so technically she should be at the 2-1/2 twice a day we will try and speak to her vascular surgeon and see if it is okay to increase decrease that otherwise I am not can make any change I will keep doing what we are doing she had been intolerant of statins    Return in about 6 months (around 7/22/2025).     As always, it has been a pleasure to participate in your patient's care.      Sincerely,       Wes Buckley MD

## 2025-01-24 DIAGNOSIS — I51.7 CARDIOMEGALY: ICD-10-CM

## 2025-01-24 DIAGNOSIS — R41.3 MEMORY IMPAIRMENT: ICD-10-CM

## 2025-01-24 DIAGNOSIS — I10 PRIMARY HYPERTENSION: ICD-10-CM

## 2025-01-24 RX ORDER — DILTIAZEM HYDROCHLORIDE 180 MG/1
180 CAPSULE, COATED, EXTENDED RELEASE ORAL DAILY
Qty: 90 CAPSULE | Refills: 0 | Status: SHIPPED | OUTPATIENT
Start: 2025-01-24

## 2025-01-24 RX ORDER — DONEPEZIL HYDROCHLORIDE 5 MG/1
5 TABLET, FILM COATED ORAL
Qty: 90 TABLET | Refills: 0 | Status: SHIPPED | OUTPATIENT
Start: 2025-01-24

## 2025-01-24 NOTE — TELEPHONE ENCOUNTER
Caller: Cat Dickinson    Relationship: Self    Best call back number: [phone]     Requested Prescriptions:   Requested Prescriptions     Pending Prescriptions Disp Refills    donepezil (ARICEPT) 5 MG tablet 90 tablet 3     Sig: Take 1 tablet by mouth Daily With Lunch.    dilTIAZem CD (Cardizem CD) 180 MG 24 hr capsule 90 capsule 3     Sig: Take 1 capsule by mouth Daily.        Pharmacy where request should be sent: [facility] [address] [place] [address] - [phone] [facility] [phone] FX     Last office visit with prescribing clinician: 12/2/2024   Last telemedicine visit with prescribing clinician: Visit date not found   Next office visit with prescribing clinician: 3/3/2025     Additional details provided by patient: PATIENT HAS A NEW PHARMACY AND IS OUT OF REFILLS.     Does the patient have less than a 3 day supply:  [x] Yes  [] No    Would you like a call back once the refill request has been completed: [] Yes [x] No    If the office needs to give you a call back, can they leave a voicemail: [] Yes [x] No    Mehul Powell Rep   01/24/25 09:45 EST

## 2025-02-06 DIAGNOSIS — K21.9 GASTROESOPHAGEAL REFLUX DISEASE, UNSPECIFIED WHETHER ESOPHAGITIS PRESENT: ICD-10-CM

## 2025-02-06 RX ORDER — PANTOPRAZOLE SODIUM 40 MG/1
40 TABLET, DELAYED RELEASE ORAL
Qty: 90 TABLET | Refills: 3 | Status: SHIPPED | OUTPATIENT
Start: 2025-02-06

## 2025-02-06 NOTE — TELEPHONE ENCOUNTER
Cat Dickinson has requested a refill of this prescription to be sent to [facility] pharmacy.    Rx Refill Note  Requested Prescriptions     Pending Prescriptions Disp Refills    pantoprazole (PROTONIX) 40 MG EC tablet 90 tablet 3     Sig: Take 1 tablet by mouth Every Morning Before Breakfast.      Last office visit with prescribing clinician: 12/2/2024   Last telemedicine visit with prescribing clinician: Visit date not found   Next office visit with prescribing clinician: 3/3/2025   Please advise this refill

## 2025-03-03 ENCOUNTER — TELEPHONE (OUTPATIENT)
Dept: FAMILY MEDICINE CLINIC | Facility: CLINIC | Age: OVER 89
End: 2025-03-03

## 2025-03-03 ENCOUNTER — OFFICE VISIT (OUTPATIENT)
Dept: FAMILY MEDICINE CLINIC | Facility: CLINIC | Age: OVER 89
End: 2025-03-03
Payer: MEDICARE

## 2025-03-03 VITALS
OXYGEN SATURATION: 96 % | WEIGHT: 127.8 LBS | RESPIRATION RATE: 16 BRPM | TEMPERATURE: 98.1 F | BODY MASS INDEX: 22.64 KG/M2 | HEART RATE: 71 BPM | DIASTOLIC BLOOD PRESSURE: 56 MMHG | SYSTOLIC BLOOD PRESSURE: 102 MMHG

## 2025-03-03 DIAGNOSIS — M81.0 OSTEOPOROSIS WITHOUT CURRENT PATHOLOGICAL FRACTURE, UNSPECIFIED OSTEOPOROSIS TYPE: ICD-10-CM

## 2025-03-03 DIAGNOSIS — I51.7 CARDIOMEGALY: ICD-10-CM

## 2025-03-03 DIAGNOSIS — I10 PRIMARY HYPERTENSION: ICD-10-CM

## 2025-03-03 DIAGNOSIS — E78.5 HYPERLIPIDEMIA, UNSPECIFIED HYPERLIPIDEMIA TYPE: ICD-10-CM

## 2025-03-03 DIAGNOSIS — I73.9 PVD (PERIPHERAL VASCULAR DISEASE): Primary | ICD-10-CM

## 2025-03-03 PROCEDURE — 99214 OFFICE O/P EST MOD 30 MIN: CPT | Performed by: FAMILY MEDICINE

## 2025-03-03 PROCEDURE — 1126F AMNT PAIN NOTED NONE PRSNT: CPT | Performed by: FAMILY MEDICINE

## 2025-03-03 RX ORDER — ATORVASTATIN CALCIUM 10 MG/1
10 TABLET, FILM COATED ORAL DAILY
Qty: 90 TABLET | Refills: 1 | Status: SHIPPED | OUTPATIENT
Start: 2025-03-03

## 2025-03-03 RX ORDER — SPIRONOLACTONE 25 MG/1
12.5 TABLET ORAL DAILY
Qty: 45 TABLET | Refills: 3 | Status: SHIPPED | OUTPATIENT
Start: 2025-03-03

## 2025-03-03 RX ORDER — VITAMIN B COMPLEX
CAPSULE ORAL DAILY
COMMUNITY

## 2025-03-03 NOTE — TELEPHONE ENCOUNTER
[facility] pharmacy stated that they dispense APIXABAN (ELIQUIS) 5 mg to patient   Take 1 tablet by mouth 2 (Two) Times a Day.  They stated that they dispense a prescription from Alexis Curran MD from 11/22/2024.  They did not receive prescription from today.     Please advise

## 2025-03-03 NOTE — TELEPHONE ENCOUNTER
Please call patient and inform her that her general surgeon has sent a higher dose, she can take the medication prescribed by her surgeon.

## 2025-03-03 NOTE — PROGRESS NOTES
Chief Complaint  Primary Care Follow-Up (3 months FU/Patient stated needs labs orders before she can get her PROLIA SHOT. )    Subjective         Cat Dickinson presents to [facility]  History of Present Illness    89-year-old female presents today for routine 3-month follow-up.    Hypertension: Currently she is on diltiazem 180 mg, spironolactone 12.5 mg and irbesartan 300 mg, patient compliant with medication.      Hyperlipidemia/peripheral vascular disease: Currently following with vascular surgery, she is not currently on cholesterol-lowering medication, she is on baby aspirin and Eliquis, this was started by vascular surgery due to suspicion of embolism source of left lower extremity ischemia, however Holter monitor and echocardiogram was negative for atrial fibrillation.      GERD: Currently on pantoprazole 40 mg every morning    Osteoporosis: Currently on Prolia injection, next scheduled injection 3/24/2025, needs labs 1 week before injection.    Patient denies chest pain, shortness of breath or palpitations.    Objective     Review of Systems     Past Medical History:   Diagnosis Date    Dementia     Hypertension     Kidney stones         Current Outpatient Medications:     apixaban (ELIQUIS) 2.5 MG tablet tablet, Take 1 tablet by mouth Every 12 (Twelve) Hours., Disp: 60 tablet, Rfl: 0    Ascorbic Acid (Vitamin C) 500 MG capsule, Take 500 mg by mouth Daily., Disp: , Rfl:     aspirin 81 MG chewable tablet, Chew 1 tablet Daily., Disp: , Rfl:     B Complex Vitamins (vitamin b complex) capsule capsule, Take  by mouth Daily., Disp: , Rfl:     dilTIAZem CD (Cardizem CD) 180 MG 24 hr capsule, Take 1 capsule by mouth Daily., Disp: 90 capsule, Rfl: 0    donepezil (ARICEPT) 5 MG tablet, Take 1 tablet by mouth Daily With Lunch., Disp: 90 tablet, Rfl: 0    irbesartan (AVAPRO) 300 MG tablet, Take 1 tablet by mouth Every Night., Disp: , Rfl:     pantoprazole (PROTONIX) 40 MG EC tablet, Take 1  tablet by mouth Every Morning Before Breakfast., Disp: 90 tablet, Rfl: 3    pyridoxine (VITAMIN B-6) 100 MG tablet, Take 1 tablet by mouth Daily., Disp: , Rfl:     Selenium 200 MCG tablet, Take  by mouth., Disp: , Rfl:     spironolactone (ALDACTONE) 25 MG tablet, Take 0.5 tablets by mouth Daily., Disp: 45 tablet, Rfl: 3    vitamin B-12 (CYANOCOBALAMIN) 1000 MCG tablet, Take 1 tablet by mouth Daily., Disp: , Rfl:     Vitamin D, Cholecalciferol, (CHOLECALCIFEROL) 10 MCG (400 UNIT) tablet, Take 1 tablet by mouth Daily., Disp: , Rfl:     atorvastatin (Lipitor) 10 MG tablet, Take 1 tablet by mouth Daily., Disp: 90 tablet, Rfl: 1    naproxen (NAPROSYN) 250 MG tablet, Take 1 tablet by mouth 2 (Two) Times a Day With Meals. (Patient not taking: Reported on 3/3/2025), Disp: 8 tablet, Rfl: 0   Social History     Socioeconomic History    Marital status:     Number of children: 2   Tobacco Use    Smoking status: Never     Passive exposure: Never    Smokeless tobacco: Never    Tobacco comments:     Caffeine: occ stephon   Vaping Use    Vaping status: Never Used   Substance and Sexual Activity    Alcohol use: Never    Drug use: Never    Sexual activity: Defer      Vital Signs:   /56 (BP Location: Left arm, Patient Position: Sitting, Cuff Size: Small Adult)   Pulse 71   Temp 98.1 °F (36.7 °C)   Resp 16   Wt 58 kg (127 lb 12.8 oz)   SpO2 96%   BMI 22.64 kg/m²       Physical Exam  Constitutional:       Appearance: Normal appearance.   Cardiovascular:      Rate and Rhythm: Normal rate and regular rhythm.      Pulses: Normal pulses.      Heart sounds: No murmur heard.  Pulmonary:      Effort: Pulmonary effort is normal. No respiratory distress.      Breath sounds: Normal breath sounds.   Musculoskeletal:      Right lower leg: No edema.      Left lower le+ Pitting Edema present.   Neurological:      Mental Status: She is alert.          Result Review :                 Assessment and Plan    Diagnoses and all orders  for this visit:    1. PVD (peripheral vascular disease) (Primary)  -     Lipid Panel; Future  -     CBC & Differential; Future  -     Comprehensive Metabolic Panel; Future  -     atorvastatin (Lipitor) 10 MG tablet; Take 1 tablet by mouth Daily.  Dispense: 90 tablet; Refill: 1  -     Lipid Panel  -     CBC & Differential  -     Comprehensive Metabolic Panel    2. Hyperlipidemia, unspecified hyperlipidemia type  -     Lipid Panel; Future  -     CBC & Differential; Future  -     Lipid Panel  -     CBC & Differential    3. Primary hypertension  -     CBC & Differential; Future  -     spironolactone (ALDACTONE) 25 MG tablet; Take 0.5 tablets by mouth Daily.  Dispense: 45 tablet; Refill: 3  -     CBC & Differential    4. Cardiomegaly  -     spironolactone (ALDACTONE) 25 MG tablet; Take 0.5 tablets by mouth Daily.  Dispense: 45 tablet; Refill: 3    5. Osteoporosis without current pathological fracture, unspecified osteoporosis type  -     CBC & Differential; Future  -     Comprehensive Metabolic Panel; Future  -     Phosphorus; Future  -     Magnesium; Future  -     spironolactone (ALDACTONE) 25 MG tablet; Take 0.5 tablets by mouth Daily.  Dispense: 45 tablet; Refill: 3  -     CBC & Differential  -     Comprehensive Metabolic Panel  -     Phosphorus  -     Magnesium  -     Vitamin D 25 hydroxy; Future    Other orders  -     apixaban (ELIQUIS) 2.5 MG tablet tablet; Take 1 tablet by mouth Every 12 (Twelve) Hours.  Dispense: 60 tablet; Refill: 0      Hypertension:   Blood pressure well-controlled, continue diltiazem, losartan and spironolactone.  Advised patient to avoid potassium rich food, list provided    PVD/hyperlipidemia: Patient is following with cardiology and vascular surgery, continue baby aspirin  Continue Eliquis, I will lower the dose to 2.5 mg as patient is 89 years old and less than 60 kg.  Because of bleeding risk with the patient and her  and agreeable to continue the medication.  Encouraged patient  and  to discuss continuing Eliquis with vascular surgery.  Start Lipitor 10 mg    Osteoporosis  Check routine labs  Continue Prolia injection    Follow Up   No follow-ups on file.  Patient was given instructions and counseling regarding her condition or for health maintenance advice. Please see specific information pulled into the AVS if appropriate.

## 2025-03-17 ENCOUNTER — TELEPHONE (OUTPATIENT)
Dept: FAMILY MEDICINE CLINIC | Facility: CLINIC | Age: OVER 89
End: 2025-03-17
Payer: MEDICARE

## 2025-03-17 NOTE — TELEPHONE ENCOUNTER
PATIENT CAME IN ADVISING WHEN HER RESULTS OF HER BLOOD WORK PERFORMED TODAY COMES IN SHE NEEDS THOSE FORWARDED TO [facility] [phone] SO SHE CAN GET HER PROLIA INJECTION

## 2025-03-18 ENCOUNTER — RESULTS FOLLOW-UP (OUTPATIENT)
Dept: FAMILY MEDICINE CLINIC | Facility: CLINIC | Age: OVER 89
End: 2025-03-18
Payer: MEDICARE

## 2025-03-18 DIAGNOSIS — E55.9 VITAMIN D DEFICIENCY: Primary | ICD-10-CM

## 2025-03-18 LAB
25(OH)D3+25(OH)D2 SERPL-MCNC: 26.6 NG/ML (ref 30–100)
ALBUMIN SERPL-MCNC: 4.3 G/DL (ref 3.6–4.6)
ALP SERPL-CCNC: 93 IU/L (ref 44–121)
ALT SERPL-CCNC: 20 IU/L (ref 0–32)
AST SERPL-CCNC: 25 IU/L (ref 0–40)
BASOPHILS # BLD AUTO: 0.1 X10E3/UL (ref 0–0.2)
BASOPHILS NFR BLD AUTO: 1 %
BILIRUB SERPL-MCNC: 0.2 MG/DL (ref 0–1.2)
BUN SERPL-MCNC: 29 MG/DL (ref 10–36)
BUN/CREAT SERPL: 21 (ref 12–28)
CALCIUM SERPL-MCNC: 10.4 MG/DL (ref 8.7–10.3)
CHLORIDE SERPL-SCNC: 109 MMOL/L (ref 96–106)
CHOLEST SERPL-MCNC: 147 MG/DL (ref 100–199)
CO2 SERPL-SCNC: 23 MMOL/L (ref 20–29)
CREAT SERPL-MCNC: 1.41 MG/DL (ref 0.57–1)
EGFRCR SERPLBLD CKD-EPI 2021: 35 ML/MIN/1.73
EOSINOPHIL # BLD AUTO: 0.3 X10E3/UL (ref 0–0.4)
EOSINOPHIL NFR BLD AUTO: 3 %
ERYTHROCYTE [DISTWIDTH] IN BLOOD BY AUTOMATED COUNT: 12.8 % (ref 11.7–15.4)
GLOBULIN SER CALC-MCNC: 2.5 G/DL (ref 1.5–4.5)
GLUCOSE SERPL-MCNC: 100 MG/DL (ref 70–99)
HCT VFR BLD AUTO: 35.9 % (ref 34–46.6)
HDLC SERPL-MCNC: 58 MG/DL
HGB BLD-MCNC: 11.8 G/DL (ref 11.1–15.9)
IMM GRANULOCYTES # BLD AUTO: 0 X10E3/UL (ref 0–0.1)
IMM GRANULOCYTES NFR BLD AUTO: 0 %
LDLC SERPL CALC-MCNC: 75 MG/DL (ref 0–99)
LYMPHOCYTES # BLD AUTO: 2.1 X10E3/UL (ref 0.7–3.1)
LYMPHOCYTES NFR BLD AUTO: 21 %
MAGNESIUM SERPL-MCNC: 2.1 MG/DL (ref 1.6–2.3)
MCH RBC QN AUTO: 29.9 PG (ref 26.6–33)
MCHC RBC AUTO-ENTMCNC: 32.9 G/DL (ref 31.5–35.7)
MCV RBC AUTO: 91 FL (ref 79–97)
MONOCYTES # BLD AUTO: 0.9 X10E3/UL (ref 0.1–0.9)
MONOCYTES NFR BLD AUTO: 9 %
NEUTROPHILS # BLD AUTO: 6.6 X10E3/UL (ref 1.4–7)
NEUTROPHILS NFR BLD AUTO: 66 %
PHOSPHATE SERPL-MCNC: 3.7 MG/DL (ref 3–4.3)
PLATELET # BLD AUTO: 241 X10E3/UL (ref 150–450)
POTASSIUM SERPL-SCNC: 4.7 MMOL/L (ref 3.5–5.2)
PROT SERPL-MCNC: 6.8 G/DL (ref 6–8.5)
RBC # BLD AUTO: 3.94 X10E6/UL (ref 3.77–5.28)
SODIUM SERPL-SCNC: 143 MMOL/L (ref 134–144)
TRIGL SERPL-MCNC: 69 MG/DL (ref 0–149)
VLDLC SERPL CALC-MCNC: 14 MG/DL (ref 5–40)
WBC # BLD AUTO: 10.1 X10E3/UL (ref 3.4–10.8)

## 2025-03-21 NOTE — TELEPHONE ENCOUNTER
Rx Refill Note  Requested Prescriptions     Pending Prescriptions Disp Refills    apixaban (ELIQUIS) 2.5 MG tablet tablet 60 tablet 0     Sig: Take 1 tablet by mouth Every 12 (Twelve) Hours.      Last office visit with prescribing clinician: 3/3/2025   Last telemedicine visit with prescribing clinician: Visit date not found   Next office visit with prescribing clinician: 6/3/2025

## 2025-03-24 NOTE — TELEPHONE ENCOUNTER
Cat Dickinson stated that she has not discussed about continue this medicaiton with vascular surgeon but she thinks that she is supposed to.   She was not at home when I called her, she did not know exactly how many mg she is taking.

## 2025-03-25 ENCOUNTER — TELEPHONE (OUTPATIENT)
Dept: FAMILY MEDICINE CLINIC | Facility: CLINIC | Age: OVER 89
End: 2025-03-25
Payer: MEDICARE

## 2025-03-25 NOTE — TELEPHONE ENCOUNTER
Patient has left a voicemail regarding ELIQUIS. She stated that she does not have anymore of it. She does not remember Dr telling her to continue to take it so maybe she does not even need it.   Please advise

## 2025-03-25 NOTE — TELEPHONE ENCOUNTER
I called and discussed with the patient  Medication was started by vascular surgery Dr. Lara for suspected embolic source, Holter monitor was negative for A-fib, echocardiogram unremarkable.    Reviewed vascular surgery last note on 12/17/2024, patient should be on baby aspirin and statin medication.      I called vascular surgery office to discuss with the surgeon if Eliquis still needed, they will call back

## 2025-04-14 DIAGNOSIS — I51.7 CARDIOMEGALY: ICD-10-CM

## 2025-04-14 DIAGNOSIS — I10 PRIMARY HYPERTENSION: ICD-10-CM

## 2025-04-14 RX ORDER — DILTIAZEM HYDROCHLORIDE 180 MG/1
180 CAPSULE, COATED, EXTENDED RELEASE ORAL DAILY
Qty: 90 CAPSULE | Refills: 0 | Status: SHIPPED | OUTPATIENT
Start: 2025-04-14

## 2025-04-14 NOTE — TELEPHONE ENCOUNTER
Rx Refill Note  Requested Prescriptions     Pending Prescriptions Disp Refills    dilTIAZem CD (CARDIZEM CD) 180 MG 24 hr capsule [Pharmacy Med Name: dilTIAZem HCl ER Coated Beads 180 MG Oral Capsule Extended Release 24 Hour] 90 capsule 0     Sig: Take 1 capsule by mouth once daily      Last office visit with prescribing clinician: Visit date not found   Last telemedicine visit with prescribing clinician: Visit date not found   Next office visit with prescribing clinician: Visit date not found   Please advise this refill

## 2025-05-19 DIAGNOSIS — R41.3 MEMORY IMPAIRMENT: ICD-10-CM

## 2025-05-19 DIAGNOSIS — E55.9 VITAMIN D DEFICIENCY: ICD-10-CM

## 2025-05-19 RX ORDER — DONEPEZIL HYDROCHLORIDE 5 MG/1
TABLET, FILM COATED ORAL
Qty: 90 TABLET | Refills: 1 | Status: SHIPPED | OUTPATIENT
Start: 2025-05-19

## 2025-05-19 NOTE — TELEPHONE ENCOUNTER
Rx Refill Note  Requested Prescriptions     Pending Prescriptions Disp Refills    donepezil (ARICEPT) 5 MG tablet [Pharmacy Med Name: Donepezil HCl 5 MG Oral Tablet] 90 tablet 0     Sig: TAKE 1 TABLET BY MOUTH ONCE DAILY WITH LUNCH    Cholecalciferol 1.25 MG (67657 UT) tablet 12 tablet 0     Sig: Take 1 tablet by mouth 1 (One) Time Per Week.      Last office visit with prescribing clinician: 03/03/2025  Last telemedicine visit with prescribing clinician: Visit date not found   Next office visit with prescribing clinician: 06/03/2025    Please advise this refill

## 2025-06-03 ENCOUNTER — OFFICE VISIT (OUTPATIENT)
Dept: FAMILY MEDICINE CLINIC | Facility: CLINIC | Age: OVER 89
End: 2025-06-03
Payer: MEDICARE

## 2025-06-03 VITALS
BODY MASS INDEX: 23.04 KG/M2 | WEIGHT: 130 LBS | DIASTOLIC BLOOD PRESSURE: 52 MMHG | TEMPERATURE: 98.8 F | RESPIRATION RATE: 16 BRPM | HEART RATE: 57 BPM | HEIGHT: 63 IN | OXYGEN SATURATION: 97 % | SYSTOLIC BLOOD PRESSURE: 120 MMHG

## 2025-06-03 DIAGNOSIS — E83.52 HYPERCALCEMIA: ICD-10-CM

## 2025-06-03 DIAGNOSIS — I73.9 PVD (PERIPHERAL VASCULAR DISEASE) WITH CLAUDICATION: ICD-10-CM

## 2025-06-03 DIAGNOSIS — E78.2 MIXED HYPERLIPIDEMIA: ICD-10-CM

## 2025-06-03 DIAGNOSIS — I73.9 PVD (PERIPHERAL VASCULAR DISEASE): ICD-10-CM

## 2025-06-03 DIAGNOSIS — N18.30 STAGE 3 CHRONIC KIDNEY DISEASE, UNSPECIFIED WHETHER STAGE 3A OR 3B CKD: ICD-10-CM

## 2025-06-03 DIAGNOSIS — I10 PRIMARY HYPERTENSION: Primary | ICD-10-CM

## 2025-06-03 DIAGNOSIS — M81.0 OSTEOPOROSIS, UNSPECIFIED OSTEOPOROSIS TYPE, UNSPECIFIED PATHOLOGICAL FRACTURE PRESENCE: ICD-10-CM

## 2025-06-03 PROCEDURE — 99214 OFFICE O/P EST MOD 30 MIN: CPT | Performed by: FAMILY MEDICINE

## 2025-06-03 PROCEDURE — G2211 COMPLEX E/M VISIT ADD ON: HCPCS | Performed by: FAMILY MEDICINE

## 2025-06-03 PROCEDURE — 1126F AMNT PAIN NOTED NONE PRSNT: CPT | Performed by: FAMILY MEDICINE

## 2025-06-03 RX ORDER — ATORVASTATIN CALCIUM 10 MG/1
10 TABLET, FILM COATED ORAL DAILY
Qty: 90 TABLET | Refills: 1 | Status: SHIPPED | OUTPATIENT
Start: 2025-06-03

## 2025-06-03 RX ORDER — ASPIRIN 81 MG/1
81 TABLET, CHEWABLE ORAL DAILY
Qty: 90 TABLET | Refills: 1 | Status: SHIPPED | OUTPATIENT
Start: 2025-06-03

## 2025-06-03 NOTE — PROGRESS NOTES
Chief Complaint  Primary Care Follow-Up    Subjective         Cat Dickinson presents to [facility]  History of Present Illness    89-year-old female presents today for routine 3-month follow-up.     Hypertension: Currently she is on diltiazem 180 mg, spironolactone 12.5 mg and irbesartan 300 mg, patient compliant with medication.       Hyperlipidemia/peripheral vascular disease: Currently following with vascular surgery, she is currently on Lipitor and baby aspirin.  She has stopped taking Eliquis due to cost.  Patient states it cost her over $400.  Eliquis, this was started by vascular surgery due to suspicion of embolism source of left lower extremity ischemia, however Holter monitor and echocardiogram was negative for atrial fibrillation.  I encouraged the patient and her  to call vascular surgery and confirm the need for anticoagulation and ask for Eliquis alternatives if needed due to the high price.  Patient was also taking aspirin only as needed, she was encouraged to take the aspirin     GERD: Currently on pantoprazole 40 mg every morning     Osteoporosis: Currently on Prolia infusion, last dose was 3/28/2025.    CKD Stage III:  stable, need repeat labs.    Alzheimer's dementia: Currently following with neurology, she is on donepezil 5 mg daily.  Patient states her son organizes her pill box.  She declined home nursing assistance.       Patient denies chest pain, shortness of breath or palpitations.      Objective     Review of Systems     Past Medical History:   Diagnosis Date    Dementia     Hypertension     Kidney stones         Current Outpatient Medications:     Ascorbic Acid (Vitamin C) 500 MG capsule, Take 500 mg by mouth Daily., Disp: , Rfl:     aspirin 81 MG chewable tablet, Chew 1 tablet Daily., Disp: 90 tablet, Rfl: 1    atorvastatin (Lipitor) 10 MG tablet, Take 1 tablet by mouth Daily., Disp: 90 tablet, Rfl: 1    B Complex Vitamins (vitamin b complex) capsule  "capsule, Take  by mouth Daily., Disp: , Rfl:     dilTIAZem CD (CARDIZEM CD) 180 MG 24 hr capsule, Take 1 capsule by mouth once daily, Disp: 90 capsule, Rfl: 0    donepezil (ARICEPT) 5 MG tablet, TAKE 1 TABLET BY MOUTH ONCE DAILY WITH LUNCH, Disp: 90 tablet, Rfl: 1    irbesartan (AVAPRO) 300 MG tablet, Take 1 tablet by mouth Every Night., Disp: , Rfl:     pantoprazole (PROTONIX) 40 MG EC tablet, Take 1 tablet by mouth Every Morning Before Breakfast., Disp: 90 tablet, Rfl: 3    pyridoxine (VITAMIN B-6) 100 MG tablet, Take 1 tablet by mouth Daily., Disp: , Rfl:     Selenium 200 MCG tablet, Take  by mouth., Disp: , Rfl:     spironolactone (ALDACTONE) 25 MG tablet, Take 0.5 tablets by mouth Daily., Disp: 45 tablet, Rfl: 3    vitamin B-12 (CYANOCOBALAMIN) 1000 MCG tablet, Take 1 tablet by mouth Daily., Disp: , Rfl:     apixaban (ELIQUIS) 2.5 MG tablet tablet, Take 1 tablet by mouth Every 12 (Twelve) Hours. (Patient not taking: Reported on 6/3/2025), Disp: 60 tablet, Rfl: 0    Cholecalciferol 1.25 MG (30166 UT) tablet, Take 1 tablet by mouth 1 (One) Time Per Week. (Patient not taking: Reported on 6/3/2025), Disp: 12 tablet, Rfl: 0   Social History     Socioeconomic History    Marital status:     Number of children: 2   Tobacco Use    Smoking status: Never     Passive exposure: Never    Smokeless tobacco: Never    Tobacco comments:     Caffeine: occ stephon   Vaping Use    Vaping status: Never Used   Substance and Sexual Activity    Alcohol use: Never    Drug use: Never    Sexual activity: Defer      Vital Signs:   /52 (BP Location: Right arm, Patient Position: Sitting)   Pulse 57   Temp 98.8 °F (37.1 °C)   Resp 16   Ht 160 cm (63\")   Wt 59 kg (130 lb)   SpO2 97%   BMI 23.03 kg/m²       Physical Exam  Constitutional:       Appearance: Normal appearance.   Cardiovascular:      Rate and Rhythm: Normal rate and regular rhythm.      Pulses: Normal pulses.      Heart sounds: No murmur heard.  Pulmonary:      " Effort: Pulmonary effort is normal.      Breath sounds: Normal breath sounds.   Musculoskeletal:      Right lower leg: No edema.      Left lower leg: No edema.   Neurological:      Mental Status: She is alert.          Result Review :                 Latest Reference Range & Units 03/17/25 11:38   Sodium 134 - 144 mmol/L 143   Potassium 3.5 - 5.2 mmol/L 4.7   Chloride 96 - 106 mmol/L 109 (H)   CO2 20 - 29 mmol/L 23   BUN 10 - 36 mg/dL 29   Creatinine 0.57 - 1.00 mg/dL 1.41 (H)   BUN/Creatinine Ratio 12 - 28  21   EGFR Result >59 mL/min/1.73 35 (L)   Glucose 70 - 99 mg/dL 100 (H)   Calcium 8.7 - 10.3 mg/dL 10.4 (H)   Magnesium 1.6 - 2.3 mg/dL 2.1   Phosphorus 3.0 - 4.3 mg/dL 3.7   Alkaline Phosphatase 44 - 121 IU/L 93   Total Protein 6.0 - 8.5 g/dL 6.8   Albumin 3.6 - 4.6 g/dL 4.3   AST (SGOT) 0 - 40 IU/L 25   ALT (SGPT) 0 - 32 IU/L 20   Total Bilirubin 0.0 - 1.2 mg/dL 0.2   Total Cholesterol 100 - 199 mg/dL 147   HDL Cholesterol >39 mg/dL 58   LDL Cholesterol  0 - 99 mg/dL 75   Triglycerides 0 - 149 mg/dL 69   VLDL Cholesterol Stew 5 - 40 mg/dL 14   25 Hydroxy, Vitamin D 30.0 - 100.0 ng/mL 26.6 (L)   Globulin 1.5 - 4.5 g/dL 2.5   WBC 3.4 - 10.8 x10E3/uL 10.1   RBC 3.77 - 5.28 x10E6/uL 3.94   Hemoglobin 11.1 - 15.9 g/dL 11.8   Hematocrit 34.0 - 46.6 % 35.9   Platelets 150 - 450 x10E3/uL 241   RDW 11.7 - 15.4 % 12.8   MCV 79 - 97 fL 91   MCH 26.6 - 33.0 pg 29.9   MCHC 31.5 - 35.7 g/dL 32.9   Neutrophil Rel % Not Estab. % 66   Lymphocyte Rel % Not Estab. % 21   Monocyte Rel % Not Estab. % 9   Eosinophil Rel % Not Estab. % 3   Basophil Rel % Not Estab. % 1   Immature Granulocyte Rel % Not Estab. % 0   Neutrophils Absolute 1.4 - 7.0 x10E3/uL 6.6   Lymphocytes Absolute 0.7 - 3.1 x10E3/uL 2.1   Monocytes Absolute 0.1 - 0.9 x10E3/uL 0.9   Eosinophils Absolute 0.0 - 0.4 x10E3/uL 0.3   Basophils Absolute 0.0 - 0.2 x10E3/uL 0.1   Immature Grans, Absolute 0.0 - 0.1 x10E3/uL 0.0   (H): Data is abnormally high  (L): Data is  abnormally low     Assessment and Plan    Diagnoses and all orders for this visit:    1. Primary hypertension (Primary)  -     Lipid Panel    2. Mixed hyperlipidemia    3. PVD (peripheral vascular disease)  -     atorvastatin (Lipitor) 10 MG tablet; Take 1 tablet by mouth Daily.  Dispense: 90 tablet; Refill: 1  -     aspirin 81 MG chewable tablet; Chew 1 tablet Daily.  Dispense: 90 tablet; Refill: 1  -     Lipid Panel    4. Stage 3 chronic kidney disease, unspecified whether stage 3a or 3b CKD  -     Ambulatory Referral to Nephrology  -     Vitamin D 25 hydroxy  -     Comprehensive metabolic panel  -     PTH, Intact  -     Calcium, Ionized  -     Phosphorus    5. Hypercalcemia    6. PVD (peripheral vascular disease) with claudication    7. Osteoporosis, unspecified osteoporosis type, unspecified pathological fracture presence      Hypertension  Blood pressure well-controlled today  Continue current medications  Recheck electrolytes    Hyperlipidemia/PVD  Continue baby aspirin  Continue Lipitor 10 mg  Recheck lipid panel  Encouraged patient to call vascular surgery and discuss continuing Eliquis    CKD/hypercalcemia  Check CMP, PTH, vitamin D and phosphorus, ionized calcium    Osteoporosis  Continue vitamin D supplements  Check vitamin D levels  Continue Prolia every 6 months          Follow Up   Return in about 3 months (around 9/3/2025).  Patient was given instructions and counseling regarding her condition or for health maintenance advice. Please see specific information pulled into the AVS if appropriate.

## 2025-07-21 DIAGNOSIS — I51.7 CARDIOMEGALY: ICD-10-CM

## 2025-07-21 DIAGNOSIS — I10 PRIMARY HYPERTENSION: ICD-10-CM

## 2025-07-21 NOTE — TELEPHONE ENCOUNTER
Please advise patient to request medication from her cardiology since I will not be seeing her anymore.

## 2025-07-21 NOTE — TELEPHONE ENCOUNTER
Rx Refill Note  Requested Prescriptions     Pending Prescriptions Disp Refills    dilTIAZem CD (CARDIZEM CD) 180 MG 24 hr capsule [Pharmacy Med Name: dilTIAZem HCl ER Coated Beads 180 MG Oral Capsule Extended Release 24 Hour] 90 capsule 0     Sig: Take 1 capsule by mouth once daily      Last office visit with prescribing clinician: 6/3/2025   Last telemedicine visit with prescribing clinician: Visit date not found   Next office visit with prescribing clinician: Visit date not found   Please advise this refill

## 2025-07-22 NOTE — TELEPHONE ENCOUNTER
[facility] for patient and [facility] pharmacy was notified with Dr Elmore's message.

## 2025-07-23 RX ORDER — DILTIAZEM HYDROCHLORIDE 180 MG/1
180 CAPSULE, COATED, EXTENDED RELEASE ORAL DAILY
Qty: 90 CAPSULE | Refills: 0 | OUTPATIENT
Start: 2025-07-23

## 2025-07-23 NOTE — TELEPHONE ENCOUNTER
Pharmacy was called on 07/22/2025 and informed that this request needs to be sent to patient's cardiologist.

## 2025-07-25 DIAGNOSIS — I10 PRIMARY HYPERTENSION: ICD-10-CM

## 2025-07-25 DIAGNOSIS — I51.7 CARDIOMEGALY: ICD-10-CM

## 2025-07-25 RX ORDER — DILTIAZEM HYDROCHLORIDE 180 MG/1
180 CAPSULE, COATED, EXTENDED RELEASE ORAL DAILY
Qty: 90 CAPSULE | Refills: 0 | Status: SHIPPED | OUTPATIENT
Start: 2025-07-25

## 2025-07-25 NOTE — TELEPHONE ENCOUNTER
Caller: LEILA MAZARIEGOS    Relationship: Emergency Contact    Best call back number: [phone]     Requested Prescriptions:   Requested Prescriptions     Pending Prescriptions Disp Refills    dilTIAZem CD (CARDIZEM CD) 180 MG 24 hr capsule 90 capsule 0     Sig: Take 1 capsule by mouth Daily.        Pharmacy where request should be sent: [facility] [address] [place] [address] - [phone]  - [phone]     Last office visit with prescribing clinician: 1/22/2025   Last telemedicine visit with prescribing clinician: Visit date not found   Next office visit with prescribing clinician: 8/8/2025     Additional details provided by patient:     Does the patient have less than a 3 day supply:  [] Yes  [x] No    Would you like a call back once the refill request has been completed: [x] Yes [] No    If the office needs to give you a call back, can they leave a voicemail: [x] Yes [] No    Mehul Almaguer Rep   07/25/25 11:13 EDT

## 2025-07-29 DIAGNOSIS — M81.0 OSTEOPOROSIS WITHOUT CURRENT PATHOLOGICAL FRACTURE, UNSPECIFIED OSTEOPOROSIS TYPE: ICD-10-CM

## 2025-07-29 DIAGNOSIS — I51.7 CARDIOMEGALY: ICD-10-CM

## 2025-07-29 DIAGNOSIS — I10 PRIMARY HYPERTENSION: ICD-10-CM

## 2025-07-29 NOTE — TELEPHONE ENCOUNTER
Rx Refill Note  Requested Prescriptions     Pending Prescriptions Disp Refills    spironolactone (ALDACTONE) 25 MG tablet 45 tablet 3     Sig: Take 0.5 tablets by mouth Daily.      Last office visit with prescribing clinician: 6/3/2025   Last telemedicine visit with prescribing clinician: Visit date not found   Next office visit with prescribing clinician: Visit date not found   Please advise this refill

## 2025-07-29 NOTE — TELEPHONE ENCOUNTER
Patient need f/u labs before filling mediation, I recommend she establish care with provider, if can't now will order labs and refill temporarily

## 2025-07-31 ENCOUNTER — TELEPHONE (OUTPATIENT)
Dept: FAMILY MEDICINE CLINIC | Facility: CLINIC | Age: OVER 89
End: 2025-07-31
Payer: MEDICARE

## 2025-07-31 RX ORDER — SPIRONOLACTONE 25 MG/1
12.5 TABLET ORAL DAILY
Qty: 30 TABLET | Refills: 0 | Status: SHIPPED | OUTPATIENT
Start: 2025-07-31

## 2025-07-31 NOTE — TELEPHONE ENCOUNTER
Patient spoke with Myron and informed her that she will establish care with DR BECERRIL in [place]. Please send refill temporarily.

## 2025-07-31 NOTE — TELEPHONE ENCOUNTER
I ASKED PT WHICH PROVIDER SHE WAS WANTING TO ESTABLISH WITH. PT SAID SHE IS GOING TO SWITCH TO A DIFFERENT OFFICE WITH A MALE DOCTOR. PT SAID DR BECERRIL IS WHO SHE IS GOING TO SEE.

## 2025-08-08 ENCOUNTER — OFFICE VISIT (OUTPATIENT)
Age: OVER 89
End: 2025-08-08
Payer: MEDICARE

## 2025-08-08 VITALS
HEIGHT: 63 IN | BODY MASS INDEX: 22.86 KG/M2 | SYSTOLIC BLOOD PRESSURE: 116 MMHG | DIASTOLIC BLOOD PRESSURE: 78 MMHG | HEART RATE: 61 BPM | WEIGHT: 129 LBS

## 2025-08-08 DIAGNOSIS — I73.9 PVD (PERIPHERAL VASCULAR DISEASE) WITH CLAUDICATION: Primary | ICD-10-CM

## 2025-08-08 DIAGNOSIS — R41.3 MEMORY IMPAIRMENT: ICD-10-CM

## 2025-08-08 PROCEDURE — 93000 ELECTROCARDIOGRAM COMPLETE: CPT | Performed by: INTERNAL MEDICINE

## 2025-08-08 PROCEDURE — 99214 OFFICE O/P EST MOD 30 MIN: CPT | Performed by: INTERNAL MEDICINE

## 2025-08-18 ENCOUNTER — DOCUMENTATION (OUTPATIENT)
Age: OVER 89
End: 2025-08-18
Payer: MEDICARE

## 2025-08-22 ENCOUNTER — TELEPHONE (OUTPATIENT)
Dept: CARDIOLOGY | Age: OVER 89
End: 2025-08-22
Payer: MEDICARE

## 2025-08-28 ENCOUNTER — TELEPHONE (OUTPATIENT)
Dept: FAMILY MEDICINE CLINIC | Facility: CLINIC | Age: OVER 89
End: 2025-08-28
Payer: MEDICARE